# Patient Record
Sex: FEMALE | Race: WHITE | URBAN - METROPOLITAN AREA
[De-identification: names, ages, dates, MRNs, and addresses within clinical notes are randomized per-mention and may not be internally consistent; named-entity substitution may affect disease eponyms.]

---

## 2017-01-06 ENCOUNTER — OFFICE VISIT (OUTPATIENT)
Dept: INTERNAL MEDICINE CLINIC | Age: 72
End: 2017-01-06

## 2017-01-06 VITALS
DIASTOLIC BLOOD PRESSURE: 84 MMHG | HEART RATE: 58 BPM | TEMPERATURE: 98.2 F | RESPIRATION RATE: 16 BRPM | HEIGHT: 65 IN | WEIGHT: 167 LBS | BODY MASS INDEX: 27.82 KG/M2 | SYSTOLIC BLOOD PRESSURE: 138 MMHG | OXYGEN SATURATION: 98 %

## 2017-01-06 DIAGNOSIS — I10 ESSENTIAL HYPERTENSION: ICD-10-CM

## 2017-01-06 DIAGNOSIS — Z72.0 TOBACCO ABUSE: ICD-10-CM

## 2017-01-06 DIAGNOSIS — E78.2 MIXED HYPERLIPIDEMIA: ICD-10-CM

## 2017-01-06 DIAGNOSIS — E55.9 VITAMIN D DEFICIENCY: ICD-10-CM

## 2017-01-06 DIAGNOSIS — F41.9 ANXIETY: ICD-10-CM

## 2017-01-06 DIAGNOSIS — R73.03 PRE-DIABETES: ICD-10-CM

## 2017-01-06 DIAGNOSIS — Z76.89 ENCOUNTER TO ESTABLISH CARE WITH NEW DOCTOR: Primary | ICD-10-CM

## 2017-01-06 DIAGNOSIS — Z82.49 FAMILY HISTORY OF ABDOMINAL AORTIC ANEURYSM (AAA): ICD-10-CM

## 2017-01-06 RX ORDER — LORAZEPAM 2 MG/1
TABLET ORAL
Refills: 0 | COMMUNITY
Start: 2016-12-19 | End: 2017-01-06 | Stop reason: SDUPTHER

## 2017-01-06 RX ORDER — LISINOPRIL 40 MG/1
TABLET ORAL
COMMUNITY
Start: 2016-12-13 | End: 2017-04-03 | Stop reason: SDUPTHER

## 2017-01-06 RX ORDER — METFORMIN HYDROCHLORIDE 500 MG/1
TABLET ORAL
COMMUNITY
Start: 2016-12-13 | End: 2017-04-03 | Stop reason: SDUPTHER

## 2017-01-06 RX ORDER — LORAZEPAM 2 MG/1
2 TABLET ORAL 4 TIMES DAILY
Qty: 120 TAB | Refills: 1 | Status: SHIPPED | OUTPATIENT
Start: 2017-01-06 | End: 2017-01-06 | Stop reason: SDUPTHER

## 2017-01-06 RX ORDER — METOPROLOL SUCCINATE 50 MG/1
TABLET, EXTENDED RELEASE ORAL
COMMUNITY
Start: 2016-11-18 | End: 2017-02-10 | Stop reason: SDUPTHER

## 2017-01-06 RX ORDER — SIMVASTATIN 20 MG/1
TABLET, FILM COATED ORAL
COMMUNITY
Start: 2016-12-13 | End: 2017-04-03 | Stop reason: SDUPTHER

## 2017-01-06 RX ORDER — LORAZEPAM 2 MG/1
2 TABLET ORAL 4 TIMES DAILY
Qty: 120 TAB | Refills: 1 | Status: SHIPPED | OUTPATIENT
Start: 2017-01-17 | End: 2017-03-03 | Stop reason: SDUPTHER

## 2017-01-06 RX ORDER — ASPIRIN 325 MG
TABLET, DELAYED RELEASE (ENTERIC COATED) ORAL
COMMUNITY
Start: 2016-12-09 | End: 2018-02-21 | Stop reason: ALTCHOICE

## 2017-01-06 NOTE — PROGRESS NOTES
Dwaine Moore is a 66 y.o. female who presents today for Establish Care  . She has a history of   Patient Active Problem List   Diagnosis Code    Essential hypertension I10    Mixed hyperlipidemia E78.2    Vitamin D deficiency E55.9    Pre-diabetes R73.03    Tobacco abuse Z72.0    Family history of abdominal aortic aneurysm (AAA) Z82.49   . Today patient is here to establish care. Previous PCP Dr. Dima Leal. she is switching because he retired. Last seen in the fall. Records are not  available for me to review. she does not have other concerns. HTN: diagnosed several years ago. She is on ACEi and BB for some time. Has been pretty well controlled. Denies any s/e. Pre-DM: Taking metformin for about 3 yrs. Taking 500mg PO BID.     HLD: on zocor. Stable. Anxiety: On Ativan 2mg PO QID. She has been on this for many years (decades). Has not been on anything else for decades as well. Last refilled on 12/19/16 for one month. Pt notes the same dose for > 10 yrs. Vitamin D def: Prescribed weekly Vitamin D, but has not started. Social: Lives here with her . Independent of ADL's. [place] in the past.   Two children,   + 2ppd X 50yrs; Has never attempted to quit. No EtOH use;     HM: notes that this is not a priority for her. Family History: some EtOH abuse in her siblings. Mother with AAA in her [de-identified]. ROS  Review of Systems   Constitutional: Negative for chills, fever and weight loss. HENT: Negative for congestion, hearing loss, sore throat and tinnitus. Eyes: Negative for blurred vision, double vision and photophobia. Respiratory: Negative for cough, hemoptysis, sputum production, shortness of breath and wheezing. Cardiovascular: Negative for chest pain, palpitations, orthopnea, claudication and leg swelling. Gastrointestinal: Negative for abdominal pain, blood in stool, constipation, diarrhea, heartburn, melena, nausea and vomiting.    Genitourinary: Negative for dysuria, frequency, hematuria and urgency. Musculoskeletal: Negative for joint pain and myalgias. Skin: Negative for rash. Neurological: Negative. Negative for headaches. Endo/Heme/Allergies: Does not bruise/bleed easily. Psychiatric/Behavioral: Negative for depression, hallucinations, memory loss, substance abuse and suicidal ideas. The patient is nervous/anxious. Visit Vitals    /84 (BP 1 Location: Left arm, BP Patient Position: Sitting)    Pulse (!) 58    Temp 98.2 °F (36.8 °C) (Oral)    Resp 16    Ht 5' 5\" (1.651 m)    Wt 167 lb (75.8 kg)    SpO2 98%    BMI 27.79 kg/m2       Physical Exam   Constitutional: She is oriented to person, place, and time and well-developed, well-nourished, and in no distress. No distress. HENT:   Head: Normocephalic and atraumatic. Right Ear: External ear normal.   Left Ear: External ear normal.   Mouth/Throat: Oropharynx is clear and moist.   Dentures to upper and lower   Eyes: EOM are normal. Pupils are equal, round, and reactive to light. Neck: Normal range of motion. No thyromegaly present. Cardiovascular: Normal rate, regular rhythm and normal heart sounds. Exam reveals no gallop and no friction rub. No murmur heard. Pulmonary/Chest: Effort normal and breath sounds normal. No respiratory distress. She has no wheezes. She has no rales. Abdominal: Soft. Bowel sounds are normal. She exhibits no distension. There is no tenderness. There is no rebound. Musculoskeletal: Normal range of motion. She exhibits no edema. Neurological: She is alert and oriented to person, place, and time. Skin: Skin is warm and dry. No rash noted. She is not diaphoretic. No erythema.    Psychiatric: Affect and judgment normal.       Current Outpatient Prescriptions   Medication Sig    lisinopril (PRINIVIL, ZESTRIL) 40 mg tablet     metFORMIN (GLUCOPHAGE) 500 mg tablet     metoprolol succinate (TOPROL-XL) 50 mg XL tablet     simvastatin (ZOCOR) 20 mg tablet     [START ON 1/17/2017] LORazepam (ATIVAN) 2 mg tablet Take 1 Tab by mouth four (4) times daily. Max Daily Amount: 8 mg.  Cholecalciferol, Vitamin D3, 50,000 unit cap      No current facility-administered medications for this visit. Past Medical History   Diagnosis Date    Anxiety     Constipation     Difficulty swallowing pills     Headache     Hypertension     Muscle pain       Past Surgical History   Procedure Laterality Date    Hx cataract removal        Social History   Substance Use Topics    Smoking status: Current Every Day Smoker     Years: 50.00    Smokeless tobacco: Never Used    Alcohol use No      Family History   Problem Relation Age of Onset    Dementia Mother     Anxiety Mother     Pulmonary Embolism Father     Hypertension Father         Allergies   Allergen Reactions    Ciprofloxacin Unknown (comments)    Lidocaine Shortness of Breath    Pcn [Penicillins] Unknown (comments)    Sulfa (Sulfonamide Antibiotics) Unknown (comments)        Assessment/Plan  Tony Johnson was seen today for establish care. Diagnoses and all orders for this visit:    Encounter to establish care with new doctor - Will get old records. Essential hypertension - at goal on dual meds. -     METABOLIC PANEL, COMPREHENSIVE  -     CBC WITH AUTOMATED DIFF  -     URINALYSIS W/ RFLX MICROSCOPIC    Mixed hyperlipidemia - On statin for some time. FLP before follow up. -     LIPID PANEL    Vitamin D deficiency - not taking replacement. Will follow up on old numbers. Pre-diabetes - on low dose metformin. Check.   -     HEMOGLOBIN A1C WITH EAG    Tobacco abuse - significant tobacco use. Discussed the importance of cessation. Family history of abdominal aortic aneurysm (AAA) - mother had this. Has been screened with mild abnormality. Will f/u old records. Anxiety - discussed concern over the amount of benzos that she is taking.  checked, refills have been very stable. Pt has also not been on any different doses for decades. Discussed that she needs to be open to trying new meds in hopes of decreasing need of benzos as these are dangerous, especially in people >64yo. Over 50% of a visit of 45 minutes spent reviewing diagnosis and treatment options and side effects of medicines. -     TSH 3RD GENERATION  -     Discontinue: LORazepam (ATIVAN) 2 mg tablet; Take 1 Tab by mouth four (4) times daily. Max Daily Amount: 8 mg.  -     LORazepam (ATIVAN) 2 mg tablet; Take 1 Tab by mouth four (4) times daily. Max Daily Amount: 8 mg.         Follow-up Disposition:  Return in about 2 months (around 3/6/2017) for Physical Exam.    Farzana Rodarte MD  1/6/2017

## 2017-01-06 NOTE — MR AVS SNAPSHOT
Visit Information Date & Time Provider Department Dept. Phone Encounter #  
 1/6/2017 10:45 AM Farzana Roadrte MD [facility] [facility] [id number] Follow-up Instructions Return in about 2 months (around 3/6/2017) for Physical Exam. Upcoming Health Maintenance Date Due DTaP/Tdap/Td series (1 - Tdap) 9/28/1965 BREAST CANCER SCRN MAMMOGRAM 9/28/1994 FOBT Q 1 YEAR AGE 50-75 9/28/1994 ZOSTER VACCINE AGE 60> 9/28/2004 GLAUCOMA SCREENING Q2Y 9/28/2009 OSTEOPOROSIS SCREENING (DEXA) 9/28/2009 Pneumococcal 65+ Low/Medium Risk (1 of 2 - PCV13) 9/28/2009 MEDICARE YEARLY EXAM 9/28/2009 INFLUENZA AGE 9 TO ADULT 8/1/2016 Allergies as of 1/6/2017  Review Complete On: 1/6/2017 By: Constance Armando LPN Severity Noted Reaction Type Reactions Ciprofloxacin  01/06/2017    Unknown (comments) Lidocaine  01/06/2017    Shortness of Breath Pcn [Penicillins]  01/06/2017    Unknown (comments) Sulfa (Sulfonamide Antibiotics)  01/06/2017    Unknown (comments) Current Immunizations  Never Reviewed No immunizations on file. Not reviewed this visit You Were Diagnosed With   
  
 Codes Comments Encounter to establish care with new doctor    -  Primary ICD-10-CM: Z71.89 ICD-9-CM: V65.8 Essential hypertension     ICD-10-CM: I10 
ICD-9-CM: 401.9 Mixed hyperlipidemia     ICD-10-CM: E78.2 ICD-9-CM: 272.2 Vitamin D deficiency     ICD-10-CM: E55.9 ICD-9-CM: 268.9 Pre-diabetes     ICD-10-CM: R73.03 
ICD-9-CM: 790.29 Tobacco abuse     ICD-10-CM: Z72.0 ICD-9-CM: 305.1 Family history of abdominal aortic aneurysm (AAA)     ICD-10-CM: Z82.49 
ICD-9-CM: V17.49 Anxiety     ICD-10-CM: F41.9 ICD-9-CM: 300.00 Vitals BP Pulse Temp Resp Height(growth percentile) Weight(growth percentile)  138/84 (BP 1 Location: Left arm, BP Patient Position: Sitting) (!) 58 98.2 °F (36.8 °C) (Oral) 16 5' 5\" (1.651 m) 167 lb (75.8 kg) SpO2 BMI OB Status Smoking Status 98% 27.79 kg/m2 Postmenopausal Current Every Day Smoker Vitals History BMI and BSA Data Body Mass Index Body Surface Area  
 27.79 kg/m 2 1.86 m 2 Preferred Pharmacy Pharmacy Name Phone [id number] [phone] Your Updated Medication List  
  
   
This list is accurate as of: 1/6/17 11:27 AM.  Always use your most recent med list.  
  
  
  
  
 Cholecalciferol (Vitamin D3) 50,000 unit Cap  
  
 lisinopril 40 mg tablet Commonly known as:  PRINIVIL, ZESTRIL  
  
 LORazepam 2 mg tablet Commonly known as:  ATIVAN Take 1 Tab by mouth four (4) times daily. Max Daily Amount: 8 mg.  
  
 metFORMIN 500 mg tablet Commonly known as:  GLUCOPHAGE  
  
 metoprolol succinate 50 mg XL tablet Commonly known as:  TOPROL-XL  
  
 simvastatin 20 mg tablet Commonly known as:  ZOCOR Prescriptions Printed Refills LORazepam (ATIVAN) 2 mg tablet 1 Sig: Take 1 Tab by mouth four (4) times daily. Max Daily Amount: 8 mg. Class: Print Route: Oral  
  
We Performed the Following CBC WITH AUTOMATED DIFF [38941 CPT(R)] HEMOGLOBIN A1C WITH EAG [86595 CPT(R)] LIPID PANEL [81797 CPT(R)] METABOLIC PANEL, COMPREHENSIVE [40980 CPT(R)] TSH 3RD GENERATION [66844 CPT(R)] URINALYSIS W/ RFLX MICROSCOPIC [71606 CPT(R)] Follow-up Instructions Return in about 2 months (around 3/6/2017) for Physical Exam.  
  
  
Introducing [facility] & HEALTH SERVICES! Gladys Gallagher introduces [employer] patient portal. Now you can access parts of your medical record, email your doctor's office, and request medication refills online. 1. In your internet browser, go to [email or web address] 2. Click on the First Time User? Click Here link in the Sign In box.  You will see the New Member Sign Up page. 3. Enter your [employer] Access Code exactly as it appears below. You will not need to use this code after youve completed the sign-up process. If you do not sign up before the expiration date, you must request a new code. · [employer] Access Code: [email or web address] Expires: 4/6/2017 10:15 AM 
 
4. Enter the last four digits of your Social Security Number (xxxx) and Date of Birth (mm/dd/yyyy) as indicated and click Submit. You will be taken to the next sign-up page. 5. Create a [employer] ID. This will be your [employer] login ID and cannot be changed, so think of one that is secure and easy to remember. 6. Create a [employer] password. You can change your password at any time. 7. Enter your Password Reset Question and Answer. This can be used at a later time if you forget your password. 8. Enter your e-mail address. You will receive e-mail notification when new information is available in [address] 9. Click Sign Up. You can now view and download portions of your medical record. 10. Click the Download Summary menu link to download a portable copy of your medical information. If you have questions, please visit the Frequently Asked Questions section of the [employer] website. Remember, [employer] is NOT to be used for urgent needs. For medical emergencies, dial 911. Now available from your iPhone and Android! Please provide this summary of care documentation to your next provider. Your primary care clinician is listed as Natan Carmichael. If you have any questions after today's visit, please call [phone].

## 2017-02-13 RX ORDER — METOPROLOL SUCCINATE 50 MG/1
50 TABLET, EXTENDED RELEASE ORAL DAILY
Qty: 90 TAB | Refills: 0 | Status: SHIPPED | OUTPATIENT
Start: 2017-02-13 | End: 2017-04-14 | Stop reason: SDUPTHER

## 2017-02-25 LAB
ALBUMIN SERPL-MCNC: 4.3 G/DL (ref 3.5–4.8)
ALBUMIN/GLOB SERPL: 2 {RATIO} (ref 1.1–2.5)
ALP SERPL-CCNC: 40 IU/L (ref 39–117)
ALT SERPL-CCNC: 9 IU/L (ref 0–32)
APPEARANCE UR: ABNORMAL
AST SERPL-CCNC: 14 IU/L (ref 0–40)
BACTERIA #/AREA URNS HPF: NORMAL /[HPF]
BASOPHILS # BLD AUTO: 0.1 X10E3/UL (ref 0–0.2)
BASOPHILS NFR BLD AUTO: 1 %
BILIRUB SERPL-MCNC: 0.4 MG/DL (ref 0–1.2)
BILIRUB UR QL STRIP: NEGATIVE
BUN SERPL-MCNC: 8 MG/DL (ref 8–27)
BUN/CREAT SERPL: 11 (ref 11–26)
CALCIUM SERPL-MCNC: 9.6 MG/DL (ref 8.7–10.3)
CASTS URNS QL MICRO: NORMAL /LPF
CHLORIDE SERPL-SCNC: 101 MMOL/L (ref 96–106)
CHOLEST SERPL-MCNC: 121 MG/DL (ref 100–199)
CO2 SERPL-SCNC: 26 MMOL/L (ref 18–29)
COLOR UR: YELLOW
CREAT SERPL-MCNC: 0.76 MG/DL (ref 0.57–1)
EOSINOPHIL # BLD AUTO: 0.2 X10E3/UL (ref 0–0.4)
EOSINOPHIL NFR BLD AUTO: 3 %
EPI CELLS #/AREA URNS HPF: NORMAL /HPF
ERYTHROCYTE [DISTWIDTH] IN BLOOD BY AUTOMATED COUNT: 14.2 % (ref 12.3–15.4)
EST. AVERAGE GLUCOSE BLD GHB EST-MCNC: 120 MG/DL
GLOBULIN SER CALC-MCNC: 2.1 G/DL (ref 1.5–4.5)
GLUCOSE SERPL-MCNC: 95 MG/DL (ref 65–99)
GLUCOSE UR QL: NEGATIVE
HBA1C MFR BLD: 5.8 % (ref 4.8–5.6)
HCT VFR BLD AUTO: 44.3 % (ref 34–46.6)
HDLC SERPL-MCNC: 39 MG/DL
HGB BLD-MCNC: 14.9 G/DL (ref 11.1–15.9)
HGB UR QL STRIP: ABNORMAL
IMM GRANULOCYTES # BLD: 0 X10E3/UL (ref 0–0.1)
IMM GRANULOCYTES NFR BLD: 0 %
INTERPRETATION, 910389: NORMAL
KETONES UR QL STRIP: NEGATIVE
LDLC SERPL CALC-MCNC: 54 MG/DL (ref 0–99)
LEUKOCYTE ESTERASE UR QL STRIP: NEGATIVE
LYMPHOCYTES # BLD AUTO: 2.1 X10E3/UL (ref 0.7–3.1)
LYMPHOCYTES NFR BLD AUTO: 27 %
MCH RBC QN AUTO: 31.6 PG (ref 26.6–33)
MCHC RBC AUTO-ENTMCNC: 33.6 G/DL (ref 31.5–35.7)
MCV RBC AUTO: 94 FL (ref 79–97)
MICRO URNS: ABNORMAL
MONOCYTES # BLD AUTO: 0.7 X10E3/UL (ref 0.1–0.9)
MONOCYTES NFR BLD AUTO: 10 %
MUCOUS THREADS URNS QL MICRO: PRESENT
NEUTROPHILS # BLD AUTO: 4.6 X10E3/UL (ref 1.4–7)
NEUTROPHILS NFR BLD AUTO: 59 %
NITRITE UR QL STRIP: NEGATIVE
PH UR STRIP: 6.5 [PH] (ref 5–7.5)
PLATELET # BLD AUTO: 207 X10E3/UL (ref 150–379)
POTASSIUM SERPL-SCNC: 3.6 MMOL/L (ref 3.5–5.2)
PROT SERPL-MCNC: 6.4 G/DL (ref 6–8.5)
PROT UR QL STRIP: NEGATIVE
RBC # BLD AUTO: 4.72 X10E6/UL (ref 3.77–5.28)
RBC #/AREA URNS HPF: NORMAL /HPF
SODIUM SERPL-SCNC: 141 MMOL/L (ref 134–144)
SP GR UR: 1.02 (ref 1–1.03)
TRIGL SERPL-MCNC: 141 MG/DL (ref 0–149)
TSH SERPL DL<=0.005 MIU/L-ACNC: 3.24 UIU/ML (ref 0.45–4.5)
UROBILINOGEN UR STRIP-MCNC: 0.2 MG/DL (ref 0.2–1)
VLDLC SERPL CALC-MCNC: 28 MG/DL (ref 5–40)
WBC # BLD AUTO: 7.7 X10E3/UL (ref 3.4–10.8)
WBC #/AREA URNS HPF: NORMAL /HPF

## 2017-03-03 ENCOUNTER — OFFICE VISIT (OUTPATIENT)
Dept: INTERNAL MEDICINE CLINIC | Age: 72
End: 2017-03-03

## 2017-03-03 VITALS
TEMPERATURE: 98 F | RESPIRATION RATE: 16 BRPM | SYSTOLIC BLOOD PRESSURE: 145 MMHG | BODY MASS INDEX: 26.82 KG/M2 | HEART RATE: 71 BPM | HEIGHT: 65 IN | DIASTOLIC BLOOD PRESSURE: 81 MMHG | OXYGEN SATURATION: 97 % | WEIGHT: 161 LBS

## 2017-03-03 DIAGNOSIS — R73.03 PRE-DIABETES: ICD-10-CM

## 2017-03-03 DIAGNOSIS — I10 ESSENTIAL HYPERTENSION: Primary | ICD-10-CM

## 2017-03-03 DIAGNOSIS — F41.9 ANXIETY: ICD-10-CM

## 2017-03-03 DIAGNOSIS — E78.2 MIXED HYPERLIPIDEMIA: ICD-10-CM

## 2017-03-03 RX ORDER — DOCUSATE SODIUM 100 MG/1
100 CAPSULE, LIQUID FILLED ORAL 2 TIMES DAILY
COMMUNITY
End: 2020-10-26

## 2017-03-03 RX ORDER — LORAZEPAM 2 MG/1
2 TABLET ORAL 4 TIMES DAILY
Qty: 120 TAB | Refills: 1 | Status: SHIPPED | OUTPATIENT
Start: 2017-03-20 | End: 2017-05-17 | Stop reason: SDUPTHER

## 2017-03-03 RX ORDER — AMLODIPINE BESYLATE 5 MG/1
5 TABLET ORAL DAILY
Qty: 30 TAB | Refills: 3 | Status: SHIPPED | OUTPATIENT
Start: 2017-03-03 | End: 2017-04-14 | Stop reason: SDUPTHER

## 2017-03-03 NOTE — MR AVS SNAPSHOT
Visit Information Date & Time Provider Department Dept. Phone Encounter #  
 3/3/2017 11:15 AM Jose Manuel Esparza MD [facility] [address] [facility] [id number] Follow-up Instructions Return in about 6 weeks (around 4/14/2017) for BP check. Upcoming Health Maintenance Date Due DTaP/Tdap/Td series (1 - Tdap) 9/28/1965 BREAST CANCER SCRN MAMMOGRAM 9/28/1994 FOBT Q 1 YEAR AGE 50-75 9/28/1994 ZOSTER VACCINE AGE 60> 9/28/2004 GLAUCOMA SCREENING Q2Y 9/28/2009 OSTEOPOROSIS SCREENING (DEXA) 9/28/2009 Pneumococcal 65+ Low/Medium Risk (1 of 2 - PCV13) 9/28/2009 MEDICARE YEARLY EXAM 9/28/2009 INFLUENZA AGE 9 TO ADULT 8/1/2016 Allergies as of 3/3/2017  Review Complete On: 3/3/2017 By: Chris Dillard LPN Severity Noted Reaction Type Reactions Ciprofloxacin  01/06/2017    Unknown (comments) Lidocaine  01/06/2017    Shortness of Breath Pcn [Penicillins]  01/06/2017    Unknown (comments) Sulfa (Sulfonamide Antibiotics)  01/06/2017    Unknown (comments) Current Immunizations  Never Reviewed No immunizations on file. Not reviewed this visit You Were Diagnosed With   
  
 Codes Comments Essential hypertension    -  Primary ICD-10-CM: I10 
ICD-9-CM: 401.9 Pre-diabetes     ICD-10-CM: R73.03 
ICD-9-CM: 790.29 Anxiety     ICD-10-CM: F41.9 ICD-9-CM: 300.00 Mixed hyperlipidemia     ICD-10-CM: E78.2 ICD-9-CM: 272.2 Vitals BP  
  
  
  
  
  
 145/81 (BP 1 Location: Left arm, BP Patient Position: Sitting) Vitals History BMI and BSA Data Body Mass Index Body Surface Area  
 26.79 kg/m 2 1.83 m 2 Preferred Pharmacy Pharmacy Name Phone [id number] [phone] Your Updated Medication List  
  
   
This list is accurate as of: 3/3/17 12:01 PM.  Always use your most recent med list.  
  
  
  
  
 amLODIPine 5 mg tablet Commonly known as:  Huber Cater Take 1 Tab by mouth daily. Cholecalciferol (Vitamin D3) 50,000 unit Cap COLACE 100 mg capsule Generic drug:  docusate sodium Take 100 mg by mouth two (2) times a day. lisinopril 40 mg tablet Commonly known as:  PRINIVIL, ZESTRIL  
  
 LORazepam 2 mg tablet Commonly known as:  ATIVAN Take 1 Tab by mouth four (4) times daily. Max Daily Amount: 8 mg. Start taking on:  3/20/2017  
  
 metFORMIN 500 mg tablet Commonly known as:  GLUCOPHAGE  
  
 metoprolol succinate 50 mg XL tablet Commonly known as:  TOPROL-XL Take 1 Tab by mouth daily for 90 days. simvastatin 20 mg tablet Commonly known as:  ZOCOR Prescriptions Printed Refills LORazepam (ATIVAN) 2 mg tablet 1 Starting on: 3/20/2017 Sig: Take 1 Tab by mouth four (4) times daily. Max Daily Amount: 8 mg. Class: Print Route: Oral  
  
Prescriptions Sent to Pharmacy Refills  
 amLODIPine (NORVASC) 5 mg tablet 3 Sig: Take 1 Tab by mouth daily. Class: Normal  
 Pharmacy: [id number] [address] [place] [address] #: [phone] Route: Oral  
  
We Performed the Following REFERRAL TO PSYCHIATRY [REF91 Custom] Comments:  
 Please evaluate patient for anxiety. Follow-up Instructions Return in about 6 weeks (around 4/14/2017) for BP check. Referral Information Referral ID Referred By Referred To  
  
 [id number] Kirstin Hatch [address] [place] [address] Phone: [phone] Fax: [phone] Visits Status Start Date End Date 1 New Request 3/3/17 3/3/18 If your referral has a status of pending review or denied, additional information will be sent to support the outcome of this decision. Introducing [facility] & HEALTH SERVICES!    
 Marcial Jaime introduces [employer] patient portal. Now you can access parts of your medical record, email your doctor's office, and request medication refills online. 1. In your internet browser, go to [email or web address] 2. Click on the First Time User? Click Here link in the Sign In box. You will see the New Member Sign Up page. 3. Enter your [employer] Access Code exactly as it appears below. You will not need to use this code after youve completed the sign-up process. If you do not sign up before the expiration date, you must request a new code. · [employer] Access Code: [email or web address] Expires: 4/6/2017 10:15 AM 
 
4. Enter the last four digits of your Social Security Number (xxxx) and Date of Birth (mm/dd/yyyy) as indicated and click Submit. You will be taken to the next sign-up page. 5. Create a [employer] ID. This will be your [employer] login ID and cannot be changed, so think of one that is secure and easy to remember. 6. Create a [employer] password. You can change your password at any time. 7. Enter your Password Reset Question and Answer. This can be used at a later time if you forget your password. 8. Enter your e-mail address. You will receive e-mail notification when new information is available in [address] 9. Click Sign Up. You can now view and download portions of your medical record. 10. Click the Download Summary menu link to download a portable copy of your medical information. If you have questions, please visit the Frequently Asked Questions section of the [employer] website. Remember, [employer] is NOT to be used for urgent needs. For medical emergencies, dial 911. Now available from your iPhone and Android! Please provide this summary of care documentation to your next provider. Your primary care clinician is listed as Jean Paul Valladares. If you have any questions after today's visit, please call [phone].

## 2017-03-03 NOTE — PROGRESS NOTES
Sue Campos is a 66 y.o. female who presents today for Hypertension  . She has a history of   Patient Active Problem List   Diagnosis Code    Essential hypertension I10    Mixed hyperlipidemia E78.2    Vitamin D deficiency E55.9    Pre-diabetes R73.03    Tobacco abuse Z72.0    Family history of abdominal aortic aneurysm (AAA) Z82.49    Anxiety F41.9   . Today patient is here for BP follow up. she does not have other concerns. Hypertension - at home BP running high (150's/90's). Taking meds as prescribed. Still smoking. Unknown why taking toprol xl BID. Will have her on 50mg daily. Hypertension ROS: taking medications as instructed, no medication side effects noted, no TIA's, no chest pain on exertion, no dyspnea on exertion, no swelling of ankles     reports that she has been smoking. She has smoked for the past 50.00 years. She has never used smokeless tobacco.    reports that she does not drink alcohol. BP Readings from Last 2 Encounters:   03/03/17 145/81   01/06/17 138/84     Hyperlipidemia - stable  Currently she takes 20 mg of zocor  ROS: taking medications as instructed, no medication side effects noted  No new myalgias, no joint pains, no weakness  No TIA's, no chest pain on exertion, no dyspnea on exertion, no swelling of ankles. Lab Results   Component Value Date/Time    Cholesterol, total 121 02/24/2017 09:03 AM    HDL Cholesterol 39 02/24/2017 09:03 AM    LDL, calculated 54 02/24/2017 09:03 AM    VLDL, calculated 28 02/24/2017 09:03 AM    Triglyceride 141 02/24/2017 09:03 AM     Anxiety: chronic use of high dose benzos. Concerned about this as this is such a high dose. This has been on this for decades. ROS  Review of Systems   Constitutional: Negative for chills, fever and weight loss. HENT: Negative for congestion and sore throat. Eyes: Negative for blurred vision, double vision and photophobia. Respiratory: Negative for cough and shortness of breath. Cardiovascular: Negative for chest pain, palpitations and leg swelling. Gastrointestinal: Negative for abdominal pain, constipation, diarrhea, heartburn, nausea and vomiting. Genitourinary: Negative for dysuria, frequency and urgency. Musculoskeletal: Negative for joint pain and myalgias. Skin: Negative for rash. Neurological: Negative. Negative for headaches. Endo/Heme/Allergies: Does not bruise/bleed easily. Psychiatric/Behavioral: Negative for memory loss and suicidal ideas. Visit Vitals    /81 (BP 1 Location: Left arm, BP Patient Position: Sitting)    Pulse 71    Temp 98 °F (36.7 °C) (Oral)    Resp 16    Ht 5' 5\" (1.651 m)    Wt 161 lb (73 kg)    SpO2 97%    BMI 26.79 kg/m2       Physical Exam   Constitutional: She is oriented to person, place, and time. She appears well-developed and well-nourished. No distress. HENT:   Head: Normocephalic and atraumatic. Neck: Normal range of motion. Neck supple. No thyromegaly present. Cardiovascular: Normal rate and regular rhythm. No murmur heard. Pulmonary/Chest: Effort normal and breath sounds normal. She has no wheezes. Musculoskeletal: Normal range of motion. She exhibits no edema. Neurological: She is alert and oriented to person, place, and time. No cranial nerve deficit. Skin: Skin is warm and dry. Psychiatric: She has a normal mood and affect. Her behavior is normal.         Current Outpatient Prescriptions   Medication Sig    docusate sodium (COLACE) 100 mg capsule Take 100 mg by mouth two (2) times a day.  amLODIPine (NORVASC) 5 mg tablet Take 1 Tab by mouth daily.  [START ON 3/20/2017] LORazepam (ATIVAN) 2 mg tablet Take 1 Tab by mouth four (4) times daily. Max Daily Amount: 8 mg.  metoprolol succinate (TOPROL-XL) 50 mg XL tablet Take 1 Tab by mouth daily for 90 days.     lisinopril (PRINIVIL, ZESTRIL) 40 mg tablet     metFORMIN (GLUCOPHAGE) 500 mg tablet     simvastatin (ZOCOR) 20 mg tablet     Cholecalciferol, Vitamin D3, 50,000 unit cap      No current facility-administered medications for this visit. Past Medical History:   Diagnosis Date    Anxiety     Constipation     Difficulty swallowing pills     Headache     Hypertension     Muscle pain       Past Surgical History:   Procedure Laterality Date    HX CATARACT REMOVAL        Social History   Substance Use Topics    Smoking status: Current Every Day Smoker     Years: 50.00    Smokeless tobacco: Never Used    Alcohol use No      Family History   Problem Relation Age of Onset    Dementia Mother     Anxiety Mother     Pulmonary Embolism Father     Hypertension Father         Allergies   Allergen Reactions    Ciprofloxacin Unknown (comments)    Lidocaine Shortness of Breath    Pcn [Penicillins] Unknown (comments)    Sulfa (Sulfonamide Antibiotics) Unknown (comments)        Assessment/Plan  Ashlie Toscano was seen today for hypertension. Diagnoses and all orders for this visit:    Essential hypertension - not at goal. Add low dose CCB. 6 week f/u for BP check. -     amLODIPine (NORVASC) 5 mg tablet; Take 1 Tab by mouth daily. Pre-diabetes - good control    Anxiety - discussed that she is on a very high benzo dose. Though I understand that this has been stable for a very long time, need psych input. She will schedule this. -     LORazepam (ATIVAN) 2 mg tablet; Take 1 Tab by mouth four (4) times daily. Max Daily Amount: 8 mg.  -     REFERRAL TO PSYCHIATRY    Mixed hyperlipidemia - good control. Colace for constipation    Follow-up Disposition:  Return in about 6 weeks (around 4/14/2017) for BP check.     Teodoro Granado MD  3/3/2017

## 2017-04-03 RX ORDER — LISINOPRIL 40 MG/1
40 TABLET ORAL DAILY
Qty: 90 TAB | Refills: 1 | Status: SHIPPED | OUTPATIENT
Start: 2017-04-03 | End: 2017-09-01 | Stop reason: SDUPTHER

## 2017-04-03 RX ORDER — METFORMIN HYDROCHLORIDE 500 MG/1
500 TABLET ORAL
Qty: 90 TAB | Refills: 1 | Status: SHIPPED | OUTPATIENT
Start: 2017-04-03 | End: 2017-05-19 | Stop reason: SDUPTHER

## 2017-04-03 RX ORDER — SIMVASTATIN 20 MG/1
20 TABLET, FILM COATED ORAL
Qty: 90 TAB | Refills: 1 | Status: SHIPPED | OUTPATIENT
Start: 2017-04-03 | End: 2017-09-01 | Stop reason: SDUPTHER

## 2017-04-07 ENCOUNTER — TELEPHONE (OUTPATIENT)
Dept: INTERNAL MEDICINE CLINIC | Age: 72
End: 2017-04-07

## 2017-04-07 NOTE — TELEPHONE ENCOUNTER
R/C to Pt and recommended Dr. Víctor Ersnt, phone: [phone], fax: [phone]. Pt verbalized understanding.

## 2017-04-07 NOTE — TELEPHONE ENCOUNTER
Pt is calling about the dr she was to get appt with for Psychiatric care, she caleld to get the appt and the dr doesn't see anyone on fridays of which the patients refers.  She is asking if Dr Adela Bundy has someone else in mind, Please contact her at [phone]

## 2017-04-10 NOTE — TELEPHONE ENCOUNTER
Pt states that Dr. Robbi Orellana only sees inpatient and the location is [address] [place] [address] in [place]. She wants to know if there is any other psychiatric care doctor that Dr. Flora Quiñones recommends. She is preferably not wanting a doctor in the city. Also, she called Dr. Franco Orn office and they don't see pt on Friday. She doesn't drive and her husbands only day off is Friday. She wants to know if we can help her get in with a doctor on a Friday.  Call pt back at [phone]

## 2017-04-10 NOTE — TELEPHONE ENCOUNTER
R/C to Pt and provided phone # to [employer], [facility] and Luis Galvin. Pt verbalized understanding.

## 2017-04-14 ENCOUNTER — OFFICE VISIT (OUTPATIENT)
Dept: INTERNAL MEDICINE CLINIC | Age: 72
End: 2017-04-14

## 2017-04-14 VITALS
RESPIRATION RATE: 18 BRPM | DIASTOLIC BLOOD PRESSURE: 83 MMHG | WEIGHT: 166.2 LBS | BODY MASS INDEX: 27.69 KG/M2 | TEMPERATURE: 98 F | HEART RATE: 77 BPM | HEIGHT: 65 IN | OXYGEN SATURATION: 94 % | SYSTOLIC BLOOD PRESSURE: 128 MMHG

## 2017-04-14 DIAGNOSIS — F41.9 ANXIETY: ICD-10-CM

## 2017-04-14 DIAGNOSIS — I10 ESSENTIAL HYPERTENSION: Primary | ICD-10-CM

## 2017-04-14 RX ORDER — AMLODIPINE BESYLATE 5 MG/1
5 TABLET ORAL DAILY
Qty: 90 TAB | Refills: 1 | Status: SHIPPED | OUTPATIENT
Start: 2017-04-14 | End: 2017-09-01 | Stop reason: SDUPTHER

## 2017-04-14 RX ORDER — METOPROLOL SUCCINATE 50 MG/1
50 TABLET, EXTENDED RELEASE ORAL DAILY
Qty: 90 TAB | Refills: 2 | Status: SHIPPED | OUTPATIENT
Start: 2017-04-14 | End: 2018-02-21 | Stop reason: SDUPTHER

## 2017-04-14 NOTE — MR AVS SNAPSHOT
Visit Information Date & Time Provider Department Dept. Phone Encounter #  
 4/14/2017 11:15 AM Janeen Yang MD [facility] [address] [id number] Follow-up Instructions Return in about 6 weeks (around 5/26/2017). Upcoming Health Maintenance Date Due DTaP/Tdap/Td series (1 - Tdap) 9/28/1965 BREAST CANCER SCRN MAMMOGRAM 9/28/1994 FOBT Q 1 YEAR AGE 50-75 9/28/1994 ZOSTER VACCINE AGE 60> 9/28/2004 GLAUCOMA SCREENING Q2Y 9/28/2009 OSTEOPOROSIS SCREENING (DEXA) 9/28/2009 Pneumococcal 65+ Low/Medium Risk (1 of 2 - PCV13) 9/28/2009 MEDICARE YEARLY EXAM 9/28/2009 INFLUENZA AGE 9 TO ADULT 8/1/2016 Allergies as of 4/14/2017  Review Complete On: 4/14/2017 By: Janeen Yang MD  
  
 Severity Noted Reaction Type Reactions Ciprofloxacin  01/06/2017    Unknown (comments) Lidocaine  01/06/2017    Shortness of Breath Pcn [Penicillins]  01/06/2017    Unknown (comments) Shellfish Derived  04/14/2017    Hives Sulfa (Sulfonamide Antibiotics)  01/06/2017    Unknown (comments) Current Immunizations  Never Reviewed No immunizations on file. Not reviewed this visit You Were Diagnosed With   
  
 Codes Comments Essential hypertension    -  Primary ICD-10-CM: I10 
ICD-9-CM: 401.9 Anxiety     ICD-10-CM: F41.9 ICD-9-CM: 300.00 Vitals BP Pulse Temp Resp Height(growth percentile) Weight(growth percentile) 128/83 (BP 1 Location: Left arm, BP Patient Position: Sitting) 77 98 °F (36.7 °C) (Oral) 18 5' 5\" (1.651 m) 166 lb 3.2 oz (75.4 kg) SpO2 BMI OB Status Smoking Status 94% 27.66 kg/m2 Postmenopausal Current Every Day Smoker Vitals History BMI and BSA Data Body Mass Index Body Surface Area  
 27.66 kg/m 2 1.86 m 2 Preferred Pharmacy Pharmacy Name Phone [address] [facility] [address] [phone] Your Updated Medication List  
  
   
This list is accurate as of: 4/14/17 11:57 AM.  Always use your most recent med list. amLODIPine 5 mg tablet Commonly known as:  [employer] Take 1 Tab by mouth daily. Cholecalciferol (Vitamin D3) 50,000 unit Cap COLACE 100 mg capsule Generic drug:  docusate sodium Take 100 mg by mouth two (2) times a day. lisinopril 40 mg tablet Commonly known as:  Adonay Bold Take 1 Tab by mouth daily. LORazepam 2 mg tablet Commonly known as:  ATIVAN Take 1 Tab by mouth four (4) times daily. Max Daily Amount: 8 mg.  
  
 metFORMIN 500 mg tablet Commonly known as:  GLUCOPHAGE Take 1 Tab by mouth daily (with breakfast). metoprolol succinate 50 mg XL tablet Commonly known as:  TOPROL-XL Take 1 Tab by mouth daily for 90 days. simvastatin 20 mg tablet Commonly known as:  ZOCOR Take 1 Tab by mouth nightly. Prescriptions Sent to Pharmacy Refills  
 amLODIPine (NORVASC) 5 mg tablet 1 Sig: Take 1 Tab by mouth daily. Class: Normal  
 Pharmacy: [address] [place] [address] Ph #: [phone] Route: Oral  
 metoprolol succinate (TOPROL-XL) 50 mg XL tablet 2 Sig: Take 1 Tab by mouth daily for 90 days. Class: Normal  
 Pharmacy: [address] [place] [address] Ph #: [phone] Route: Oral  
  
Follow-up Instructions Return in about 6 weeks (around 5/26/2017). Introducing [facility]! [employer] introduces [employer] patient portal. Now you can access parts of your medical record, email your doctor's office, and request medication refills online. 1. In your internet browser, go to [email or web address] 2. Click on the First Time User? Click Here link in the Sign In box. You will see the New Member Sign Up page. 3. Enter your [employer] Access Code exactly as it appears below. You will not need to use this code after youve completed the sign-up process. If you do not sign up before the expiration date, you must request a new code. · [employer] Access Code: [email or web address] Expires: 7/13/2017 11:57 AM 
 
4. Enter the last four digits of your Social Security Number (xxxx) and Date of Birth (mm/dd/yyyy) as indicated and click Submit. You will be taken to the next sign-up page. 5. Create a [employer] ID. This will be your [employer] login ID and cannot be changed, so think of one that is secure and easy to remember. 6. Create a [employer] password. You can change your password at any time. 7. Enter your Password Reset Question and Answer. This can be used at a later time if you forget your password. 8. Enter your e-mail address. You will receive e-mail notification when new information is available in [address] 9. Click Sign Up. You can now view and download portions of your medical record. 10. Click the Download Summary menu link to download a portable copy of your medical information. If you have questions, please visit the Frequently Asked Questions section of the [employer] website. Remember, [employer] is NOT to be used for urgent needs. For medical emergencies, dial 911. Now available from your iPhone and Android! Please provide this summary of care documentation to your next provider. Your primary care clinician is listed as Aaliyah Cordial. If you have any questions after today's visit, please call [phone].

## 2017-04-14 NOTE — PROGRESS NOTES
Mary Carmen Guerra is a 66 y.o. female who presents today for Blood Pressure Check  . She has a history of   Patient Active Problem List   Diagnosis Code    Essential hypertension I10    Mixed hyperlipidemia E78.2    Vitamin D deficiency E55.9    Pre-diabetes R73.03    Tobacco abuse Z72.0    Family history of abdominal aortic aneurysm (AAA) Z82.49    Anxiety F41.9   . Today patient is here for BP check. she does not have other concerns. Going to [place] for 50th wedding anniversary. Hypertension - Low dose CCB added at last visit. No s/e with this meds. Still on ACEi. Hypertension ROS: taking medications as instructed, no medication side effects noted, no TIA's, no chest pain on exertion, no dyspnea on exertion, no swelling of ankles     reports that she has been smoking. She has smoked for the past 50.00 years. She has never used smokeless tobacco.    reports that she does not drink alcohol. BP Readings from Last 2 Encounters:   04/14/17 128/83   03/03/17 145/81         ROS  Review of Systems   Constitutional: Negative for chills, fever and weight loss. HENT: Negative for congestion and sore throat. Eyes: Negative for blurred vision, double vision and photophobia. Respiratory: Negative for cough and shortness of breath. Cardiovascular: Negative for chest pain, palpitations and leg swelling. Gastrointestinal: Negative for abdominal pain, constipation, diarrhea, heartburn, nausea and vomiting. Genitourinary: Negative for dysuria, frequency and urgency. Musculoskeletal: Negative for joint pain and myalgias. Skin: Negative for rash. Neurological: Negative. Negative for headaches. Endo/Heme/Allergies: Does not bruise/bleed easily. Psychiatric/Behavioral: Negative for depression, hallucinations, memory loss, substance abuse and suicidal ideas. The patient is nervous/anxious. The patient does not have insomnia.         Visit Vitals    /83 (BP 1 Location: Left arm, BP Patient Position: Sitting)    Pulse 77    Temp 98 °F (36.7 °C) (Oral)    Resp 18    Ht 5' 5\" (1.651 m)    Wt 166 lb 3.2 oz (75.4 kg)    SpO2 94%    BMI 27.66 kg/m2       Physical Exam   Constitutional: She is oriented to person, place, and time. She appears well-developed and well-nourished. No distress. HENT:   Head: Normocephalic and atraumatic. Right Ear: External ear normal.   Left Ear: External ear normal.   Eyes: EOM are normal. Pupils are equal, round, and reactive to light. Neck: Normal range of motion. Neck supple. Cardiovascular: Normal rate and regular rhythm. No murmur heard. Pulmonary/Chest: Effort normal and breath sounds normal. No respiratory distress. Abdominal: Soft. Bowel sounds are normal. She exhibits no distension. Musculoskeletal: Normal range of motion. She exhibits no edema. Neurological: She is alert and oriented to person, place, and time. Skin: Skin is warm and dry. Psychiatric: She has a normal mood and affect. Her behavior is normal.         Current Outpatient Prescriptions   Medication Sig    amLODIPine (NORVASC) 5 mg tablet Take 1 Tab by mouth daily.  metoprolol succinate (TOPROL-XL) 50 mg XL tablet Take 1 Tab by mouth daily for 90 days.  simvastatin (ZOCOR) 20 mg tablet Take 1 Tab by mouth nightly.  lisinopril (PRINIVIL, ZESTRIL) 40 mg tablet Take 1 Tab by mouth daily.  metFORMIN (GLUCOPHAGE) 500 mg tablet Take 1 Tab by mouth daily (with breakfast).  LORazepam (ATIVAN) 2 mg tablet Take 1 Tab by mouth four (4) times daily. Max Daily Amount: 8 mg.  docusate sodium (COLACE) 100 mg capsule Take 100 mg by mouth two (2) times a day.  Cholecalciferol, Vitamin D3, 50,000 unit cap      No current facility-administered medications for this visit.          Past Medical History:   Diagnosis Date    Anxiety     Constipation     Difficulty swallowing pills     Headache     Hypertension     Muscle pain       Past Surgical History:   Procedure Laterality Date    HX CATARACT REMOVAL        Social History   Substance Use Topics    Smoking status: Current Every Day Smoker     Years: 50.00    Smokeless tobacco: Never Used    Alcohol use No      Family History   Problem Relation Age of Onset    Dementia Mother     Anxiety Mother     Pulmonary Embolism Father     Hypertension Father         Allergies   Allergen Reactions    Ciprofloxacin Unknown (comments)    Lidocaine Shortness of Breath    Pcn [Penicillins] Unknown (comments)    Shellfish Derived Hives    Sulfa (Sulfonamide Antibiotics) Unknown (comments)        Assessment/Plan  Kimberly Connell was seen today for blood pressure check. Diagnoses and all orders for this visit:    Essential hypertension - improvement with addition of low dose CCB. No s/e. Inquired about BB. Discussed that given underlying anxiety, will leave on. Continue ACEi. -     amLODIPine (NORVASC) 5 mg tablet; Take 1 Tab by mouth daily. -     metoprolol succinate (TOPROL-XL) 50 mg XL tablet; Take 1 Tab by mouth daily for 90 days. Anxiety - on very high dose benzos. Has not been able to get in with BS physch due to schedule. Has reached out to [facility] and will be seeing them after her 50th wedding anniversary trip. Explained that they may not change anything, but given dose of benzos, would like their input. Over 50% of a visit of 25 minutes spent reviewing diagnosis and treatment options and side effects of medicines. Follow-up Disposition:  Return in about 6 weeks (around 5/26/2017).     Jose Monroy MD  4/14/2017

## 2017-05-17 DIAGNOSIS — F41.9 ANXIETY: ICD-10-CM

## 2017-05-17 RX ORDER — METFORMIN HYDROCHLORIDE 500 MG/1
500 TABLET ORAL
Qty: 90 TAB | Refills: 1 | Status: CANCELLED | OUTPATIENT
Start: 2017-05-17

## 2017-05-18 ENCOUNTER — TELEPHONE (OUTPATIENT)
Dept: INTERNAL MEDICINE CLINIC | Age: 72
End: 2017-05-18

## 2017-05-18 NOTE — TELEPHONE ENCOUNTER
Per patient her LORazepam (ATIVAN) 2 mg tablet was not at the Pharmacy last night and she needs it called into the [facility] at [phone]    And her Metformin sent to her mail order [facility]

## 2017-05-19 RX ORDER — LORAZEPAM 2 MG/1
2 TABLET ORAL 4 TIMES DAILY
Qty: 120 TAB | Refills: 1 | OUTPATIENT
Start: 2017-05-19

## 2017-05-19 RX ORDER — METFORMIN HYDROCHLORIDE 500 MG/1
500 TABLET ORAL
Qty: 90 TAB | Refills: 1 | Status: SHIPPED | OUTPATIENT
Start: 2017-05-19 | End: 2017-08-21 | Stop reason: SDUPTHER

## 2017-06-02 ENCOUNTER — OFFICE VISIT (OUTPATIENT)
Dept: INTERNAL MEDICINE CLINIC | Age: 72
End: 2017-06-02

## 2017-06-02 VITALS
HEIGHT: 65 IN | SYSTOLIC BLOOD PRESSURE: 132 MMHG | TEMPERATURE: 97.9 F | HEART RATE: 78 BPM | WEIGHT: 165 LBS | OXYGEN SATURATION: 95 % | BODY MASS INDEX: 27.49 KG/M2 | DIASTOLIC BLOOD PRESSURE: 80 MMHG | RESPIRATION RATE: 12 BRPM

## 2017-06-02 DIAGNOSIS — Z13.39 SCREENING FOR ALCOHOLISM: ICD-10-CM

## 2017-06-02 DIAGNOSIS — Z72.0 TOBACCO ABUSE: ICD-10-CM

## 2017-06-02 DIAGNOSIS — Z51.81 ENCOUNTER FOR MEDICATION MONITORING: ICD-10-CM

## 2017-06-02 DIAGNOSIS — Z71.89 ADVANCED CARE PLANNING/COUNSELING DISCUSSION: ICD-10-CM

## 2017-06-02 DIAGNOSIS — Z00.00 ROUTINE GENERAL MEDICAL EXAMINATION AT A HEALTH CARE FACILITY: ICD-10-CM

## 2017-06-02 DIAGNOSIS — Z13.31 SCREENING FOR DEPRESSION: ICD-10-CM

## 2017-06-02 DIAGNOSIS — Z00.00 MEDICARE ANNUAL WELLNESS VISIT, SUBSEQUENT: Primary | ICD-10-CM

## 2017-06-02 DIAGNOSIS — I10 ESSENTIAL HYPERTENSION: ICD-10-CM

## 2017-06-02 DIAGNOSIS — F41.9 ANXIETY: ICD-10-CM

## 2017-06-02 RX ORDER — LORAZEPAM 2 MG/1
TABLET ORAL
Qty: 120 TAB | Refills: 2 | Status: SHIPPED | OUTPATIENT
Start: 2017-06-02 | End: 2017-06-02 | Stop reason: SDUPTHER

## 2017-06-02 RX ORDER — LORAZEPAM 2 MG/1
TABLET ORAL
Qty: 120 TAB | Refills: 2 | Status: SHIPPED | OUTPATIENT
Start: 2017-06-18 | End: 2017-09-01 | Stop reason: SDUPTHER

## 2017-06-02 NOTE — PATIENT INSTRUCTIONS
Medicare Part B Preventive Services Guidelines/Limitations Date last completed and Frequency Due Date   Bone Mass Measurement  (age 72 & older, biennial) Requires diagnosis related to osteoporosis or estrogen deficiency. Biennial benefit unless patient has history of long-term glucocorticoid tx or baseline is needed because initial test was by other method Completed greater than 5 years ago    Recommended every 2 years As recommended by your PCP or Specialist     Cardiovascular Screening Blood Tests (every 5 years)  Total cholesterol, HDL, Triglycerides Order as a panel if possible Completed 2/2017    As recommended by your PCP As recommended by your PCP or Specialist   Colorectal Cancer Screening  -Fecal occult blood test (annual)  -Flexible sigmoidoscopy (5y)  -Screening colonoscopy (10y)  -Barium Enema Age 49-80; After age [de-identified] if history of abnormal results Never received     Recommended every 5 to 10 years  Patient declined colonoscopy & at home FIT screening at this time. Counseling to Prevent Tobacco Use (up to 8 sessions per year)  - Counseling greater than 3 and up to 10 minutes  - Counseling greater than 10 minutes Patients must be asymptomatic of tobacco-related conditions to receive as preventive service N/A N/A   Diabetes Screening Tests (at least every 3 years, Medicare covers annually or at 6-month intervals for prediabetic patients)    Fasting blood sugar (FBS) or glucose tolerance test (GTT) Patient must be diagnosed with one of the following:  -Hypertension, Dyslipidemia, obesity, previous impaired FBS or GTT  Or any two of the following: overweight, FH of diabetes, age ? 72, history of gestational diabetes, birth of baby weighing more than 9 pounds Completed 2/2017    Recommended every 3 years for non-diabetics     As recommended by your PCP or Specialist     Glaucoma Screening (no USPSTF recommendation) Diabetes mellitus, family history, , age 48 or over,  American, age 72 or over Completed within the last year or two    Recommended annually As recommended by your PCP or Specialist   Seasonal Influenza Vaccination (annually)  Recommended Annually Patient declined   TDAP Vaccination  Recommended every 10 years Patient declined       Zoster (Shingles) Vaccination Covered by Medicare Part D through the pharmacy- PCP provides prescription Recommended once over age 48  Patient declined   Pneumococcal Vaccination (once after 72)  Pneumo 21-   Recommended once over the age of 72    Prevnar 15-  Recommended once over the age of 72 Patient declined        Patient declined   Screening Mammography (biennial age 54-69) Annually (age 36 or over) Completed greater than 5 years ago. As recommended by your PCP or Specialist     Screening Pap Tests and Pelvic Examination (up to age 79 and after 79 if unknown history or abnormal study last 10 years) Every 24 months except high risk As recommended by your PCP or Specialist   As recommended by your PCP or Specialist     Ultrasound Screening for Abdominal Aortic Aneurysm (AAA) (once) Patient must be referred through IPPE and not have had a screening for abdominal aortic aneurysm before under Medicare. Limited to patients who meet one of the following criteria:  - Men who are 73-68 years old and have smoked more than 100 cigarettes in their lifetime.  -Anyone with a FH of AAA  -Anyone recommended for screening by USPSTF Not indicated unless recommended by PCP   Not indicated unless recommended by PCP     Family Practice Management 2011    Please bring a copy of your completed advance medical directive to the office so it may be added to your medical record. Thank you. If you have any questions or concerns please feel free to contact me at [phone]. It was a pleasure meeting you today and participating in your healthcare.   Brinda Perkins RN

## 2017-06-02 NOTE — MR AVS SNAPSHOT
Visit Information Date & Time Provider Department Dept. Phone Encounter #  
 6/2/2017 11:15 AM Neris Vargas MD [facility] [address] [id number] Follow-up Instructions Return in about 3 months (around 9/2/2017). Upcoming Health Maintenance Date Due DTaP/Tdap/Td series (1 - Tdap) 9/28/1965 BREAST CANCER SCRN MAMMOGRAM 9/28/1994 FOBT Q 1 YEAR AGE 50-75 9/28/1994 ZOSTER VACCINE AGE 60> 9/28/2004 GLAUCOMA SCREENING Q2Y 9/28/2009 OSTEOPOROSIS SCREENING (DEXA) 9/28/2009 Pneumococcal 65+ Low/Medium Risk (1 of 2 - PCV13) 9/28/2009 INFLUENZA AGE 9 TO ADULT 8/1/2017 MEDICARE YEARLY EXAM 6/3/2018 Allergies as of 6/2/2017  Review Complete On: 6/2/2017 By: Irma Carballo LPN Severity Noted Reaction Type Reactions Ciprofloxacin  01/06/2017    Unknown (comments) Lidocaine  01/06/2017    Shortness of Breath Pcn [Penicillins]  01/06/2017    Unknown (comments) Shellfish Derived  04/14/2017    Hives Sulfa (Sulfonamide Antibiotics)  01/06/2017    Unknown (comments) Current Immunizations  Never Reviewed No immunizations on file. Not reviewed this visit You Were Diagnosed With   
  
 Codes Comments Anxiety    -  Primary ICD-10-CM: F41.9 ICD-9-CM: 300.00 Essential hypertension     ICD-10-CM: I10 
ICD-9-CM: 401.9 Tobacco abuse     ICD-10-CM: Z72.0 ICD-9-CM: 305.1 Encounter for medication monitoring     ICD-10-CM: Z51.81 
ICD-9-CM: V58.83 Vitals BP Pulse Temp Resp Height(growth percentile) Weight(growth percentile) 132/80 (BP 1 Location: Right arm, BP Patient Position: Sitting) 78 97.9 °F (36.6 °C) (Oral) 12 5' 5\" (1.651 m) 165 lb (74.8 kg) SpO2 BMI OB Status Smoking Status 95% 27.46 kg/m2 Postmenopausal Current Every Day Smoker Vitals History BMI and BSA Data Body Mass Index Body Surface Area  
 27.46 kg/m 2 1.85 m 2 Preferred Pharmacy Pharmacy Name Phone [facility], [place] - [address] [phone] Your Updated Medication List  
  
   
This list is accurate as of: 6/2/17 12:01 PM.  Always use your most recent med list. amLODIPine 5 mg tablet Commonly known as:  Sherald Burkitt Take 1 Tab by mouth daily. Cholecalciferol (Vitamin D3) 50,000 unit Cap COLACE 100 mg capsule Generic drug:  docusate sodium Take 100 mg by mouth two (2) times a day. lisinopril 40 mg tablet Commonly known as:  John Mash Take 1 Tab by mouth daily. LORazepam 2 mg tablet Commonly known as:  ATIVAN  
take 1 tablet by mouth four times a day Start taking on:  6/18/2017  
  
 metFORMIN 500 mg tablet Commonly known as:  GLUCOPHAGE Take 1 Tab by mouth daily (with breakfast). metoprolol succinate 50 mg XL tablet Commonly known as:  TOPROL-XL Take 1 Tab by mouth daily for 90 days. simvastatin 20 mg tablet Commonly known as:  ZOCOR Take 1 Tab by mouth nightly. Prescriptions Printed Refills LORazepam (ATIVAN) 2 mg tablet 2 Starting on: 6/18/2017 Sig: take 1 tablet by mouth four times a day Class: Print We Performed the Following 10-PANEL URINE DRUG SCREEN [[id number] Custom] Follow-up Instructions Return in about 3 months (around 9/2/2017). Patient Instructions Medicare Part B Preventive Services Guidelines/Limitations Date last completed and Frequency Due Date Bone Mass Measurement 
(age 72 & older, biennial) Requires diagnosis related to osteoporosis or estrogen deficiency. Biennial benefit unless patient has history of long-term glucocorticoid tx or baseline is needed because initial test was by other method Completed greater than 5 years ago Recommended every 2 years As recommended by your PCP or Specialist 
  
Cardiovascular Screening Blood Tests (every 5 years) Total cholesterol, HDL, Triglycerides Order as a panel if possible Completed 2/2017 As recommended by your PCP As recommended by your PCP or Specialist  
Colorectal Cancer Screening 
-Fecal occult blood test (annual) -Flexible sigmoidoscopy (5y) 
-Screening colonoscopy (10y) -Barium Enema Age 49-80; After age [de-identified] if history of abnormal results Never received Recommended every 5 to 10 years  Patient declined colonoscopy & at home FIT screening at this time. Counseling to Prevent Tobacco Use (up to 8 sessions per year) - Counseling greater than 3 and up to 10 minutes - Counseling greater than 10 minutes Patients must be asymptomatic of tobacco-related conditions to receive as preventive service N/A N/A Diabetes Screening Tests (at least every 3 years, Medicare covers annually or at 6-month intervals for prediabetic patients) Fasting blood sugar (FBS) or glucose tolerance test (GTT) Patient must be diagnosed with one of the following: 
-Hypertension, Dyslipidemia, obesity, previous impaired FBS or GTT 
Or any two of the following: overweight, FH of diabetes, age ? 72, history of gestational diabetes, birth of baby weighing more than 9 pounds Completed 2/2017 Recommended every 3 years for non-diabetics As recommended by your PCP or Specialist 
  
Glaucoma Screening (no USPSTF recommendation) Diabetes mellitus, family history, , age 48 or over,  American, age 72 or over Completed within the last year or two Recommended annually As recommended by your PCP or Specialist  
Seasonal Influenza Vaccination (annually)  Recommended Annually Patient declined TDAP Vaccination  Recommended every 10 years Patient declined Zoster (Shingles) Vaccination Covered by Medicare Part D through the pharmacy- PCP provides prescription Recommended once over age 48  Patient declined Pneumococcal Vaccination (once after 65)  Pneumo 23- Recommended once over the age of 72 
 
 Prevnar 13-  Recommended once over the age of 72 Patient declined Patient declined Screening Mammography (biennial age 54-69) Annually (age 36 or over) Completed greater than 5 years ago. As recommended by your PCP or Specialist 
  
Screening Pap Tests and Pelvic Examination (up to age 79 and after 79 if unknown history or abnormal study last 10 years) Every 24 months except high risk As recommended by your PCP or Specialist 
 As recommended by your PCP or Specialist 
  
Ultrasound Screening for Abdominal Aortic Aneurysm (AAA) (once) Patient must be referred through IPPE and not have had a screening for abdominal aortic aneurysm before under Medicare. Limited to patients who meet one of the following criteria: 
- Men who are 73-68 years old and have smoked more than 100 cigarettes in their lifetime. 
-Anyone with a FH of AAA 
-Anyone recommended for screening by USPSTF Not indicated unless recommended by PCP Not indicated unless recommended by PCP Family Practice Management 2011 Please bring a copy of your completed advance medical directive to the office so it may be added to your medical record. Thank you. If you have any questions or concerns please feel free to contact me at [phone]. It was a pleasure meeting you today and participating in your healthcare. Irving London RN Introducing [facility]! P[employer] Z[employer] introduces [employer] patient portal. Now you can access parts of your medical record, email your doctor's office, and request medication refills online. 1. In your internet browser, go to [email or web address] 2. Click on the First Time User? Click Here link in the Sign In box. You will see the New Member Sign Up page. 3. Enter your [employer] Access Code exactly as it appears below. You will not need to use this code after youve completed the sign-up process. If you do not sign up before the expiration date, you must request a new code. · "[employer] Access Code: [email or web address] Expires: 7/13/2017 11:57 AM 
 
4. Enter the last four digits of your Social Security Number (xxxx) and Date of Birth (mm/dd/yyyy) as indicated and click Submit. You will be taken to the next sign-up page. 5. Create a "[employer] ID. This will be your "[employer] login ID and cannot be changed, so think of one that is secure and easy to remember. 6. Create a "[employer] password. You can change your password at any time. 7. Enter your Password Reset Question and Answer. This can be used at a later time if you forget your password. 8. Enter your e-mail address. You will receive e-mail notification when new information is available in [address] 9. Click Sign Up. You can now view and download portions of your medical record. 10. Click the Download Summary menu link to download a portable copy of your medical information. If you have questions, please visit the Frequently Asked Questions section of the "[employer] website. Remember, "[employer] is NOT to be used for urgent needs. For medical emergencies, dial 911. Now available from your iPhone and Android! Please provide this summary of care documentation to your next provider. Your primary care clinician is listed as Nona Cooney. If you have any questions after today's visit, please call [phone].

## 2017-06-02 NOTE — PROGRESS NOTES
Matthew Sanchez is a 66 y.o. female who presents today for Follow-up (Pt here for 6 week f/u ) and Annual Wellness Visit  . She has a history of   Patient Active Problem List   Diagnosis Code    Essential hypertension I10    Mixed hyperlipidemia E78.2    Vitamin D deficiency E55.9    Pre-diabetes R73.03    Tobacco abuse Z72.0    Family history of abdominal aortic aneurysm (AAA) Z82.49    Anxiety F41.9    Advanced care planning/counseling discussion Z71.89   . Today patient is here for f/u. MW done today by our NN. Please refer to her note. she does have other concerns. She notes that her beast friend has stage IV cancer. Not doing well. Her  may be loosing his job. Pt had a good trip to [place]. Hypertension - A bit up initially today. On repeat better. Hypertension ROS: taking medications as instructed, no medication side effects noted, no TIA's, no chest pain on exertion, no dyspnea on exertion, no swelling of ankles     reports that she has been smoking. She has smoked for the past 50.00 years. She has never used smokeless tobacco.    reports that she does not drink alcohol. BP Readings from Last 2 Encounters:   06/02/17 132/80   04/14/17 128/83     Anxiety: chronic use of high dose benzos. Concerned about this as this is such a high dose. This has been on this for decades. Has made an appointment with Dr. Nakia Hall on [facility] [place] [address]. Appointment on August 3rd. Still taking QID ativan.  checked and last fill was 5/20. MW: due for multiple things. No c-scope or mammogram.   Immunizations: Refusing. ROS  Review of Systems   Constitutional: Negative for chills, fever and weight loss. HENT: Negative for congestion and sore throat. Eyes: Negative for blurred vision, double vision and photophobia. Respiratory: Negative for cough and shortness of breath. Cardiovascular: Negative for chest pain, palpitations and leg swelling.    Gastrointestinal: Negative for abdominal pain, constipation, diarrhea, heartburn, nausea and vomiting. Genitourinary: Negative for dysuria, frequency, hematuria and urgency. Musculoskeletal: Negative for joint pain and myalgias. Skin: Negative for rash. Neurological: Negative. Negative for headaches. Endo/Heme/Allergies: Does not bruise/bleed easily. Psychiatric/Behavioral: Negative for memory loss and suicidal ideas. The patient is nervous/anxious (Worse recently. ) and has insomnia. Visit Vitals    /80 (BP 1 Location: Right arm, BP Patient Position: Sitting)    Pulse 78    Temp 97.9 °F (36.6 °C) (Oral)    Resp 12    Ht 5' 5\" (1.651 m)    Wt 165 lb (74.8 kg)    SpO2 95%    BMI 27.46 kg/m2       Physical Exam   Constitutional: She is oriented to person, place, and time. She appears well-developed and well-nourished. HENT:   Head: Normocephalic and atraumatic. Eyes: EOM are normal. Pupils are equal, round, and reactive to light. Cardiovascular: Normal rate and regular rhythm. No murmur heard. Pulmonary/Chest: Breath sounds normal. No respiratory distress. Musculoskeletal: Normal range of motion. She exhibits no edema. Neurological: She is alert and oriented to person, place, and time. Skin: Skin is warm and dry. Psychiatric: Her behavior is normal.   Tearful today         Current Outpatient Prescriptions   Medication Sig    [START ON 6/18/2017] LORazepam (ATIVAN) 2 mg tablet take 1 tablet by mouth four times a day    metFORMIN (GLUCOPHAGE) 500 mg tablet Take 1 Tab by mouth daily (with breakfast).  amLODIPine (NORVASC) 5 mg tablet Take 1 Tab by mouth daily.  metoprolol succinate (TOPROL-XL) 50 mg XL tablet Take 1 Tab by mouth daily for 90 days.  simvastatin (ZOCOR) 20 mg tablet Take 1 Tab by mouth nightly.  lisinopril (PRINIVIL, ZESTRIL) 40 mg tablet Take 1 Tab by mouth daily.  docusate sodium (COLACE) 100 mg capsule Take 100 mg by mouth two (2) times a day.     Cholecalciferol, Vitamin D3, 50,000 unit cap      No current facility-administered medications for this visit. Past Medical History:   Diagnosis Date    Anxiety     Constipation     Difficulty swallowing pills     Headache     Hypertension     Muscle pain       Past Surgical History:   Procedure Laterality Date    HX CATARACT REMOVAL        Social History   Substance Use Topics    Smoking status: Current Every Day Smoker     Years: 50.00    Smokeless tobacco: Never Used    Alcohol use No      Family History   Problem Relation Age of Onset    Dementia Mother     Anxiety Mother     Pulmonary Embolism Father     Hypertension Father         Allergies   Allergen Reactions    Ciprofloxacin Unknown (comments)    Lidocaine Shortness of Breath    Pcn [Penicillins] Unknown (comments)    Shellfish Derived Hives    Sulfa (Sulfonamide Antibiotics) Unknown (comments)        Assessment/Plan  Marilia Pinon was seen today for follow-up and annual wellness visit. Diagnoses and all orders for this visit:    Medicare annual wellness visit, subsequent - See NN note. Refusing all screening tests. Urged her to review the recommendations and we can discuss these in the future. Essential hypertension - much improved with addition of CCB. Anxiety - refill very high dose benzos. Appt in august with psych. Very worried about changing regiment. Discussed given her current dose, need to have specialist involved in care. In addition has not seen psych in some time and needs to have evaluation for this for her benefit. Over 50% of a visit of 25 minutes spent reviewing diagnosis and treatment options and side effects of medicines. -     Discontinue: LORazepam (ATIVAN) 2 mg tablet; take 1 tablet by mouth four times a day  -     10-PANEL URINE DRUG SCREEN  -     LORazepam (ATIVAN) 2 mg tablet; take 1 tablet by mouth four times a day    Tobacco abuse - still smoking.      Encounter for medication monitoring  -     Discontinue: LORazepam (ATIVAN) 2 mg tablet; take 1 tablet by mouth four times a day  -     LORazepam (ATIVAN) 2 mg tablet; take 1 tablet by mouth four times a day    Routine general medical examination at a health care facility    Screening for alcoholism    Screening for depression    Advanced care planning/counseling discussion        Follow-up Disposition:  Return in about 3 months (around 9/2/2017).     Ari Holliday MD  6/2/2017

## 2017-06-02 NOTE — PROGRESS NOTES
Nurse Navigator Medicare Wellness Visit performed by MAYITO Landa RN    This is a Subsequent Matheus Visit providing Personalized Prevention Plan Services (PPPS) (Performed 12 months after initial AWV and PPPS )    I have reviewed the patient's medical history in detail and updated the computerized patient record. History     Past Medical History:   Diagnosis Date    Anxiety     Constipation     Difficulty swallowing pills     Headache     Hypertension     Muscle pain       Past Surgical History:   Procedure Laterality Date    HX CATARACT REMOVAL       Current Outpatient Prescriptions   Medication Sig Dispense Refill    [START ON 6/18/2017] LORazepam (ATIVAN) 2 mg tablet take 1 tablet by mouth four times a day 120 Tab 2    metFORMIN (GLUCOPHAGE) 500 mg tablet Take 1 Tab by mouth daily (with breakfast). 90 Tab 1    amLODIPine (NORVASC) 5 mg tablet Take 1 Tab by mouth daily. 90 Tab 1    metoprolol succinate (TOPROL-XL) 50 mg XL tablet Take 1 Tab by mouth daily for 90 days. 90 Tab 2    simvastatin (ZOCOR) 20 mg tablet Take 1 Tab by mouth nightly. 90 Tab 1    lisinopril (PRINIVIL, ZESTRIL) 40 mg tablet Take 1 Tab by mouth daily. 90 Tab 1    docusate sodium (COLACE) 100 mg capsule Take 100 mg by mouth two (2) times a day.       Cholecalciferol, Vitamin D3, 50,000 unit cap        Allergies   Allergen Reactions    Ciprofloxacin Unknown (comments)    Lidocaine Shortness of Breath    Pcn [Penicillins] Unknown (comments)    Shellfish Derived Hives    Sulfa (Sulfonamide Antibiotics) Unknown (comments)     Family History   Problem Relation Age of Onset    Dementia Mother     Anxiety Mother     Pulmonary Embolism Father     Hypertension Father      Social History   Substance Use Topics    Smoking status: Current Every Day Smoker     Years: 50.00    Smokeless tobacco: Never Used    Alcohol use No     Patient Active Problem List   Diagnosis Code    Essential hypertension I10    Mixed hyperlipidemia E78.2    Vitamin D deficiency E55.9    Pre-diabetes R73.03    Tobacco abuse Z72.0    Family history of abdominal aortic aneurysm (AAA) Z82.49    Anxiety F41.9    Advanced care planning/counseling discussion Z71.89       Depression Risk Factor Screening:   Patient denies feelings of being down, depressed or hopeless at this time. Patient states that they have a strong support system within their family & friends. Patient shares that she has a history of anxiety, which can be severe at times. Patient states that PCP is aware & is partnering with her to manage her anxiety medication. Patient adds that her anxiety has been heightened recently due to her best friend (from the age of 15) was recently diagnosed with stage IV colon cancer which has spread throughout her body. NN provided therapeutic listening. Patient adds that she has been working on getting an appointment with a psychiatrist per the request of PCP & this has been causing her anxiety & to feel 'jittery.' PCP notified. PHQ over the last two weeks 6/2/2017   Little interest or pleasure in doing things Not at all   Feeling down, depressed or hopeless Not at all   Total Score PHQ 2 0     Alcohol Risk Factor Screening: On any occasion during the past 3 months, have you had more than 3 drinks containing alcohol? No    Do you average more than 7 drinks per week? No      Functional Ability and Level of Safety:     Hearing Loss   normal-to-mild    Activities of Daily Living   Self-care. Patient states that she lives with her  in a private residence. Patient states independence in all ADLs & denies the use of assistive devices for ambulation. NN encouraged patient to continue and/ or introduce routine physical exercise into their daily routine as applicable & as recommended by PCP. Patient verbalized understanding & agreement to take this into consideration.  Patient shares that she does not drive by choice & her  assists with her transportation needs. Patient shares that she may have to begin driving again to be helpful to her aging . Requires assistance with:   ADL Assessment 6/2/2017   Feeding yourself No Help Needed   Getting from bed to chair No Help Needed   Getting dressed No Help Needed   Bathing or showering No Help Needed   Walk across the room (includes cane/walker) No Help Needed   Using the telphone No Help Needed   Taking your medications No Help Needed   Preparing meals No Help Needed   Managing money (expenses/bills) No Help Needed   Moderately strenuous housework (laundry) No Help Needed   Shopping for personal items (toiletries/medicines) No Help Needed   Shopping for groceries No Help Needed   Driving Help Needed   Climbing a flight of stairs No Help Needed   Getting to places beyond walking distances Help Needed       Fall Risk   Patient denies falls within the past year & verbalizes awareness of fall prevention strategies. Fall Risk Assessment, last 12 mths 6/2/2017   Able to walk? Yes   Fall in past 12 months? No     Abuse Screen   Patient is not abused     Abuse Screening Questionnaire 6/2/2017   Do you ever feel afraid of your partner? N   Are you in a relationship with someone who physically or mentally threatens you? N   Is it safe for you to go home? Y       Review of Systems   Medicare Wellness Visit    Physical Examination     Evaluation of Cognitive Function:  Mood/affect:  Neutral, anxious yet pleasant  Appearance: age appropriate and casually dressed  Family member/caregiver input: None present; however, patient reports a strong support system. No exam performed today, Medicare Wellness Visit.     Patient Care Team:  Danna Pillai MD as PCP - General (Internal Medicine)    Advice/Referrals/Counseling   Education and counseling provided:  End-of-Life planning (with patient's consent)  Pneumococcal Vaccine  Influenza Vaccine  Screening Mammography  Screening Pap and pelvic (covered once every 2 years)  Colorectal cancer screening tests  Bone mass measurement (DEXA)  Screening for glaucoma  tdap & shingles vaccinations      Assessment/Plan   1. Patient states conversation about Advanced Medical Directive has been started, but nothing written down yet. NN encouraged patient to complete Advanced Medical Directive paperwork & bring a copy to the office for scanning into the medical record. Patient verbalized understanding & agreement. 2. Patient declines all recommended immunizations (flu, tdap, shingles, pneumonia 23 & prevnar 13). Patient states that she has never received any of the recommended vaccinations due to her complex allergy history. Patient adds that she would rather take the risk of getting these illnesses versus the risk of an allergic reaction to the vaccine itself. PCP notified. NN informed patient of the benefits to receiving the recommended immunizations. Patient verbalized understanding, but once again respectfully declined. Patient's health maintenance immunization record has been updated & is current. 3. Patient states that it has been greater than 5 years since her last screening mammogram & screening dexa scan. PCP notified. Patient declined orders for these preventative tests today, but she may consider this again in the future. Patient admits that she has never completed a screening colonoscopy & patient declines order for a screening colonoscopy today, as well as declining the order for an at-home FIT colon screening. PCP notified. 4. Patient wears corrective lenses. Patient reports having a routine eye exam & glaucoma screening within the last year performed by an ophthalmologist at the [facility] in [place], [place]. [place] faxed requesting a copy of patient's last eye exam with glaucoma screening with patient's verbal approval.     5. Please see depression screening section for additional information.     Patient verbalized understanding of all information discussed. Patient was given the opportunity to ask questions. Medication reconciliation completed by MA/ LPN and reviewed by PCP. Patient provided AVS which includes Medicare Wellness Preventative Screening Table.

## 2017-06-03 LAB
AMPHETAMINES UR QL SCN: NEGATIVE NG/ML
BARBITURATES UR QL SCN: NEGATIVE NG/ML
BENZODIAZ UR QL: NEGATIVE NG/ML
BZE UR QL: NEGATIVE NG/ML
CANNABINOIDS UR QL SCN: NEGATIVE NG/ML
MDMA UR QL SCN: NEGATIVE NG/ML
METHADONE UR QL SCN: NEGATIVE NG/ML
METHAQUALONE UR QL: NEGATIVE NG/ML
OPIATES UR QL: NEGATIVE NG/ML
PCP UR QL: NEGATIVE NG/ML
PROPOXYPH UR QL: NEGATIVE NG/ML

## 2017-08-03 ENCOUNTER — TELEPHONE (OUTPATIENT)
Dept: INTERNAL MEDICINE CLINIC | Age: 72
End: 2017-08-03

## 2017-08-03 NOTE — TELEPHONE ENCOUNTER
Called by Psychiatrist Dr. Layla Jon,   Notes that he saw her today. She has been on long term very high dose benzos. This is concerning to him. Seems that he is not interested in helping wean her or continue this therapy. I explained that I wanted to have psychiatric input and agree that this is too much given risks with this. He suggested that she see Dr. Tj Granado who specializes in panic d/o at [facility]. Will make sure that she has received his referral.   Otherwise [facility] would be options.

## 2017-08-04 NOTE — TELEPHONE ENCOUNTER
Contacted Pt, she states she has been provided with Dr. Pedro Conteh phone number ([phone]). Pt states she has not contacted Dr. Power Conn yet d/t her husbands upcoming cataract surgeries in the next weeks. Pt asked if Dr. Rojas Christianson could decrease her Ativan to 1 mg QID instead of 2 mg QID. Advised Pt as per last OV note: \"Discussed given her current dose, need to have specialist involved in care\". Pt verbalized understanding and stated she would contact Dr. Pedro Conteh office to schedule appt.

## 2017-08-07 ENCOUNTER — TELEPHONE (OUTPATIENT)
Dept: INTERNAL MEDICINE CLINIC | Age: 72
End: 2017-08-07

## 2017-08-22 RX ORDER — METFORMIN HYDROCHLORIDE 500 MG/1
TABLET ORAL
Qty: 90 TAB | Refills: 1 | Status: SHIPPED | OUTPATIENT
Start: 2017-08-22 | End: 2018-02-21 | Stop reason: SDUPTHER

## 2017-09-01 ENCOUNTER — OFFICE VISIT (OUTPATIENT)
Dept: INTERNAL MEDICINE CLINIC | Age: 72
End: 2017-09-01

## 2017-09-01 VITALS
HEART RATE: 75 BPM | TEMPERATURE: 99 F | RESPIRATION RATE: 16 BRPM | SYSTOLIC BLOOD PRESSURE: 120 MMHG | WEIGHT: 167 LBS | DIASTOLIC BLOOD PRESSURE: 73 MMHG | OXYGEN SATURATION: 95 % | BODY MASS INDEX: 27.82 KG/M2 | HEIGHT: 65 IN

## 2017-09-01 DIAGNOSIS — I10 ESSENTIAL HYPERTENSION: ICD-10-CM

## 2017-09-01 DIAGNOSIS — F41.9 ANXIETY: ICD-10-CM

## 2017-09-01 DIAGNOSIS — E78.2 MIXED HYPERLIPIDEMIA: Primary | ICD-10-CM

## 2017-09-01 DIAGNOSIS — Z51.81 ENCOUNTER FOR MEDICATION MONITORING: ICD-10-CM

## 2017-09-01 RX ORDER — LORAZEPAM 2 MG/1
TABLET ORAL
Qty: 120 TAB | Refills: 2 | Status: CANCELLED | OUTPATIENT
Start: 2017-09-01

## 2017-09-01 RX ORDER — LISINOPRIL 40 MG/1
40 TABLET ORAL DAILY
Qty: 90 TAB | Refills: 1 | Status: SHIPPED | OUTPATIENT
Start: 2017-09-01 | End: 2018-02-21 | Stop reason: SDUPTHER

## 2017-09-01 RX ORDER — AMLODIPINE BESYLATE 5 MG/1
5 TABLET ORAL DAILY
Qty: 90 TAB | Refills: 1 | Status: SHIPPED | OUTPATIENT
Start: 2017-09-01 | End: 2018-02-21 | Stop reason: SDUPTHER

## 2017-09-01 RX ORDER — LORAZEPAM 2 MG/1
TABLET ORAL
Qty: 120 TAB | Refills: 2 | Status: SHIPPED | OUTPATIENT
Start: 2017-09-18 | End: 2018-02-21 | Stop reason: ALTCHOICE

## 2017-09-01 RX ORDER — LORAZEPAM 2 MG/1
TABLET ORAL
Qty: 120 TAB | Refills: 2 | Status: SHIPPED | OUTPATIENT
Start: 2017-09-01 | End: 2017-09-01 | Stop reason: SDUPTHER

## 2017-09-01 RX ORDER — SIMVASTATIN 20 MG/1
20 TABLET, FILM COATED ORAL
Qty: 90 TAB | Refills: 1 | Status: SHIPPED | OUTPATIENT
Start: 2017-09-01 | End: 2018-02-21 | Stop reason: SDUPTHER

## 2017-09-01 NOTE — PROGRESS NOTES
Everardo Brown is a 66 y.o. female who presents today for Medication Evaluation (Requests refill for lorazepam to go to [facility])  . She has a history of   Patient Active Problem List   Diagnosis Code    Essential hypertension I10    Mixed hyperlipidemia E78.2    Vitamin D deficiency E55.9    Pre-diabetes R73.03    Tobacco abuse Z72.0    Family history of abdominal aortic aneurysm (AAA) Z82.49    Anxiety F41.9    Advanced care planning/counseling discussion Z71.89   . Today patient is here for med check. Anxiety: chronic use of high dose benzos. Concerned about this as this is such a high dose. This has been on this for decades. Seen by Dr. Jerry Álvarez who agrees that this is too much, but referred to Dr. George Olsen 9/13/2017 who is more of a specialist in benzos. Still taking QID ativan. Has tried cutting back dosage, but having some issues with this.  checked and last fill was 8/19. Hypertension - On CCB, Toprol XL, ACEi. Hypertension ROS: taking medications as instructed, no medication side effects noted, no TIA's, no chest pain on exertion, no dyspnea on exertion, no swelling of ankles     reports that she has been smoking. She has smoked for the past 50.00 years. She has never used smokeless tobacco.    reports that she does not drink alcohol. BP Readings from Last 2 Encounters:   09/01/17 120/73   06/02/17 132/80         ROS  Review of Systems   Constitutional: Negative for chills, fever and weight loss. Respiratory: Negative for cough and shortness of breath. Cardiovascular: Negative for chest pain, palpitations and leg swelling. Gastrointestinal: Negative for abdominal pain, nausea and vomiting. Neurological: Negative. Endo/Heme/Allergies: Does not bruise/bleed easily. Psychiatric/Behavioral: Negative for depression, hallucinations, memory loss, substance abuse and suicidal ideas. The patient is nervous/anxious. The patient does not have insomnia.         Visit Vitals    /73 (BP 1 Location: Left arm, BP Patient Position: Sitting)    Pulse 75    Temp 99 °F (37.2 °C) (Oral)    Resp 16    Ht 5' 5\" (1.651 m)    Wt 167 lb (75.8 kg)    SpO2 95%    BMI 27.79 kg/m2       Physical Exam   Constitutional: She is oriented to person, place, and time. She appears well-developed and well-nourished. HENT:   Head: Normocephalic and atraumatic. Cardiovascular: Normal rate and regular rhythm. No murmur heard. Pulmonary/Chest: Effort normal. No respiratory distress. Neurological: She is alert and oriented to person, place, and time. Skin: Skin is warm and dry. Psychiatric: She has a normal mood and affect. Her behavior is normal.         Current Outpatient Prescriptions   Medication Sig    lisinopril (PRINIVIL, ZESTRIL) 40 mg tablet Take 1 Tab by mouth daily.  amLODIPine (NORVASC) 5 mg tablet Take 1 Tab by mouth daily.  simvastatin (ZOCOR) 20 mg tablet Take 1 Tab by mouth nightly.  [START ON 9/18/2017] LORazepam (ATIVAN) 2 mg tablet take 1 tablet by mouth four times a day    metFORMIN (GLUCOPHAGE) 500 mg tablet TAKE 1 TABLET EVERY DAY WITH BREAKFAST    metoprolol succinate (TOPROL-XL) 50 mg XL tablet Take 1 Tab by mouth daily for 90 days.  docusate sodium (COLACE) 100 mg capsule Take 100 mg by mouth two (2) times a day.  Cholecalciferol, Vitamin D3, 50,000 unit cap      No current facility-administered medications for this visit.          Past Medical History:   Diagnosis Date    Anxiety     Constipation     Difficulty swallowing pills     Headache     Hypertension     Muscle pain       Past Surgical History:   Procedure Laterality Date    HX CATARACT REMOVAL        Social History   Substance Use Topics    Smoking status: Current Every Day Smoker     Years: 50.00    Smokeless tobacco: Never Used    Alcohol use No      Family History   Problem Relation Age of Onset    Dementia Mother     Anxiety Mother     Pulmonary Embolism Father    [facility] Hypertension Father         Allergies   Allergen Reactions    Ciprofloxacin Unknown (comments)    Lidocaine Shortness of Breath    Pcn [Penicillins] Unknown (comments)    Shellfish Derived Hives    Sulfa (Sulfonamide Antibiotics) Unknown (comments)        Assessment/Plan  Diagnoses and all orders for this visit:    1. Mixed hyperlipidemia - stable. Refill  -     simvastatin (ZOCOR) 20 mg tablet; Take 1 Tab by mouth nightly. 2. Anxiety -  checked. Refill. Seeing psych this mos. Will f/u on his recs. -     LORazepam (ATIVAN) 2 mg tablet; take 1 tablet by mouth four times a day    3. Encounter for medication monitoring  -     LORazepam (ATIVAN) 2 mg tablet; take 1 tablet by mouth four times a day    4. Essential hypertension - refill.   -     lisinopril (PRINIVIL, ZESTRIL) 40 mg tablet; Take 1 Tab by mouth daily. -     amLODIPine (NORVASC) 5 mg tablet; Take 1 Tab by mouth daily. Follow-up Disposition:  Return in about 3 months (around 2017).     Luellen Apgar, MD  2017

## 2017-09-01 NOTE — MR AVS SNAPSHOT
Visit Information Date & Time Provider Department Dept. Phone Encounter #  
 9/1/2017 11:30 AM Don Johnston MD [facility] [address] [id number] Follow-up Instructions Return in about 3 months (around 12/1/2017). Upcoming Health Maintenance Date Due  
 BREAST CANCER SCRN MAMMOGRAM 9/28/1994 FOBT Q 1 YEAR AGE 50-75 9/28/1994 OSTEOPOROSIS SCREENING (DEXA) 9/28/2009 GLAUCOMA SCREENING Q2Y 4/26/2016 INFLUENZA AGE 9 TO ADULT 8/1/2017 Pneumococcal 65+ Low/Medium Risk (2 of 2 - PPSV23) 6/2/2018 MEDICARE YEARLY EXAM 6/3/2018 DTaP/Tdap/Td series (2 - Td) 6/2/2027 Allergies as of 9/1/2017  Review Complete On: 9/1/2017 By: Sabrina Sanchez LPN Severity Noted Reaction Type Reactions Ciprofloxacin  01/06/2017    Unknown (comments) Lidocaine  01/06/2017    Shortness of Breath Pcn [Penicillins]  01/06/2017    Unknown (comments) Shellfish Derived  04/14/2017    Hives Sulfa (Sulfonamide Antibiotics)  01/06/2017    Unknown (comments) Current Immunizations  Never Reviewed No immunizations on file. Not reviewed this visit You Were Diagnosed With   
  
 Codes Comments Mixed hyperlipidemia    -  Primary ICD-10-CM: R81.5 ICD-9-CM: 272.2 Anxiety     ICD-10-CM: F41.9 ICD-9-CM: 300.00 Encounter for medication monitoring     ICD-10-CM: Z51.81 
ICD-9-CM: V58.83 Essential hypertension     ICD-10-CM: I10 
ICD-9-CM: 401.9 Vitals BP Pulse Temp Resp Height(growth percentile) Weight(growth percentile) 120/73 (BP 1 Location: Left arm, BP Patient Position: Sitting) 75 99 °F (37.2 °C) (Oral) 16 5' 5\" (1.651 m) 167 lb (75.8 kg) SpO2 BMI OB Status Smoking Status 95% 27.79 kg/m2 Postmenopausal Current Every Day Smoker Vitals History BMI and BSA Data Body Mass Index Body Surface Area  
 27.79 kg/m 2 1.86 m 2 Preferred Pharmacy Pharmacy Name Phone [address] [phone] Your Updated Medication List  
  
   
This list is accurate as of: 9/1/17 12:48 PM.  Always use your most recent med list. amLODIPine 5 mg tablet Commonly known as:  Felisa Lino Take 1 Tab by mouth daily. Cholecalciferol (Vitamin D3) 50,000 unit Cap COLACE 100 mg capsule Generic drug:  docusate sodium Take 100 mg by mouth two (2) times a day. lisinopril 40 mg tablet Commonly known as:  Mechele Livings Take 1 Tab by mouth daily. LORazepam 2 mg tablet Commonly known as:  ATIVAN  
take 1 tablet by mouth four times a day  
  
 metFORMIN 500 mg tablet Commonly known as:  GLUCOPHAGE  
TAKE 1 TABLET EVERY DAY WITH BREAKFAST  
  
 metoprolol succinate 50 mg XL tablet Commonly known as:  TOPROL-XL Take 1 Tab by mouth daily for 90 days. simvastatin 20 mg tablet Commonly known as:  ZOCOR Take 1 Tab by mouth nightly. Prescriptions Printed Refills LORazepam (ATIVAN) 2 mg tablet 2 Sig: take 1 tablet by mouth four times a day Class: Print Prescriptions Sent to Pharmacy Refills  
 lisinopril (PRINIVIL, ZESTRIL) 40 mg tablet 1 Sig: Take 1 Tab by mouth daily. Class: Normal  
 Pharmacy: [address] [place] [address] Ph #: [phone] Route: Oral  
 amLODIPine (NORVASC) 5 mg tablet 1 Sig: Take 1 Tab by mouth daily. Class: Normal  
 Pharmacy: [address] [place] [address] Ph #: [phone] Route: Oral  
 simvastatin (ZOCOR) 20 mg tablet 1 Sig: Take 1 Tab by mouth nightly. Class: Normal  
 Pharmacy: [address] [place] [address] Ph #: [phone] Route: Oral  
  
Follow-up Instructions Return in about 3 months (around 12/1/2017). Introducing [facility]! Chelsey Peck introduces [employer] patient portal. Now you can access parts of your medical record, email your doctor's office, and request medication refills online. 1. In your internet browser, go to https://[email or web address]. [email or web address]/[employer] 2. Click on the First Time User? Click Here link in the Sign In box. You will see the New Member Sign Up page. 3. Enter your [employer] Access Code exactly as it appears below. You will not need to use this code after youve completed the sign-up process. If you do not sign up before the expiration date, you must request a new code. · [employer] Access Code: [email or web address] Expires: 11/30/2017 11:53 AM 
 
4. Enter the last four digits of your Social Security Number (xxxx) and Date of Birth (mm/dd/yyyy) as indicated and click Submit. You will be taken to the next sign-up page. 5. Create a [employer] ID. This will be your [employer] login ID and cannot be changed, so think of one that is secure and easy to remember. 6. Create a [employer] password. You can change your password at any time. 7. Enter your Password Reset Question and Answer. This can be used at a later time if you forget your password. 8. Enter your e-mail address. You will receive e-mail notification when new information is available in [address] 9. Click Sign Up. You can now view and download portions of your medical record. 10. Click the Download Summary menu link to download a portable copy of your medical information. If you have questions, please visit the Frequently Asked Questions section of the [employer] website. Remember, [employer] is NOT to be used for urgent needs. For medical emergencies, dial 911. Now available from your iPhone and Android! Please provide this summary of care documentation to your next provider. Your primary care clinician is listed as Nilo Reid. If you have any questions after today's visit, please call [phone].

## 2018-02-21 ENCOUNTER — OFFICE VISIT (OUTPATIENT)
Dept: INTERNAL MEDICINE CLINIC | Age: 73
End: 2018-02-21

## 2018-02-21 VITALS
HEART RATE: 68 BPM | HEIGHT: 65 IN | OXYGEN SATURATION: 94 % | SYSTOLIC BLOOD PRESSURE: 132 MMHG | TEMPERATURE: 98 F | WEIGHT: 165 LBS | RESPIRATION RATE: 18 BRPM | DIASTOLIC BLOOD PRESSURE: 82 MMHG | BODY MASS INDEX: 27.49 KG/M2

## 2018-02-21 DIAGNOSIS — B37.9 YEAST INFECTION: ICD-10-CM

## 2018-02-21 DIAGNOSIS — R73.01 IFG (IMPAIRED FASTING GLUCOSE): ICD-10-CM

## 2018-02-21 DIAGNOSIS — I10 ESSENTIAL HYPERTENSION: Primary | ICD-10-CM

## 2018-02-21 DIAGNOSIS — E78.5 HYPERLIPIDEMIA, UNSPECIFIED HYPERLIPIDEMIA TYPE: ICD-10-CM

## 2018-02-21 DIAGNOSIS — E78.2 MIXED HYPERLIPIDEMIA: ICD-10-CM

## 2018-02-21 DIAGNOSIS — E55.9 VITAMIN D DEFICIENCY: ICD-10-CM

## 2018-02-21 DIAGNOSIS — Z82.49 FAMILY HISTORY OF ABDOMINAL AORTIC ANEURYSM (AAA): ICD-10-CM

## 2018-02-21 DIAGNOSIS — Z72.0 TOBACCO ABUSE: ICD-10-CM

## 2018-02-21 DIAGNOSIS — F41.9 ANXIETY: ICD-10-CM

## 2018-02-21 RX ORDER — CLONAZEPAM 0.5 MG/1
TABLET ORAL
Refills: 0 | COMMUNITY
Start: 2018-02-17 | End: 2018-10-10 | Stop reason: SDUPTHER

## 2018-02-21 RX ORDER — METOPROLOL SUCCINATE 50 MG/1
50 TABLET, EXTENDED RELEASE ORAL DAILY
Qty: 90 TAB | Refills: 2 | Status: SHIPPED | OUTPATIENT
Start: 2018-02-21 | End: 2018-10-10 | Stop reason: SDUPTHER

## 2018-02-21 RX ORDER — AMLODIPINE BESYLATE 5 MG/1
5 TABLET ORAL DAILY
Qty: 90 TAB | Refills: 1 | Status: SHIPPED | OUTPATIENT
Start: 2018-02-21 | End: 2018-08-23 | Stop reason: SDUPTHER

## 2018-02-21 RX ORDER — NYSTATIN 100000 U/G
OINTMENT TOPICAL 2 TIMES DAILY
Qty: 15 G | Refills: 0 | Status: SHIPPED | OUTPATIENT
Start: 2018-02-21 | End: 2018-10-10

## 2018-02-21 RX ORDER — NYSTATIN 100000 U/G
OINTMENT TOPICAL 2 TIMES DAILY
Qty: 15 G | Refills: 0 | Status: SHIPPED | OUTPATIENT
Start: 2018-02-21 | End: 2018-02-21 | Stop reason: SDUPTHER

## 2018-02-21 RX ORDER — LORAZEPAM 1 MG/1
1 TABLET ORAL DAILY
Refills: 0 | COMMUNITY
Start: 2018-02-19 | End: 2019-02-21

## 2018-02-21 RX ORDER — SIMVASTATIN 20 MG/1
20 TABLET, FILM COATED ORAL
Qty: 90 TAB | Refills: 1 | Status: SHIPPED | OUTPATIENT
Start: 2018-02-21 | End: 2018-08-23 | Stop reason: SDUPTHER

## 2018-02-21 RX ORDER — NYSTATIN 100000 [USP'U]/G
POWDER TOPICAL 4 TIMES DAILY
Qty: 60 G | Refills: 3 | Status: SHIPPED | OUTPATIENT
Start: 2018-02-21 | End: 2018-10-10

## 2018-02-21 RX ORDER — NYSTATIN 100000 [USP'U]/G
POWDER TOPICAL 4 TIMES DAILY
Qty: 60 G | Refills: 3 | Status: SHIPPED | OUTPATIENT
Start: 2018-02-21 | End: 2018-02-21 | Stop reason: SDUPTHER

## 2018-02-21 RX ORDER — METFORMIN HYDROCHLORIDE 500 MG/1
TABLET ORAL
Qty: 90 TAB | Refills: 1 | Status: SHIPPED | OUTPATIENT
Start: 2018-02-21 | End: 2018-08-23 | Stop reason: SDUPTHER

## 2018-02-21 RX ORDER — LISINOPRIL 40 MG/1
40 TABLET ORAL DAILY
Qty: 90 TAB | Refills: 1 | Status: SHIPPED | OUTPATIENT
Start: 2018-02-21 | End: 2018-08-23 | Stop reason: SDUPTHER

## 2018-02-21 NOTE — MR AVS SNAPSHOT
[address] [facility] 
 
 
 [address] [place] [address] [facility] 
[phone] Patient: Flex Andrade MRN: [id number] [facility]:[id number] Visit Information Date & Time Provider Department Dept. Phone Encounter #  
 2/21/2018 10:45 AM Giuliana Campbell MD [facility] [address] [facility] [id number] Follow-up Instructions Return in about 6 months (around 8/21/2018) for Medicare Wellness. Upcoming Health Maintenance Date Due  
 BREAST CANCER SCRN MAMMOGRAM 9/28/1994 FOBT Q 1 YEAR AGE 50-75 9/28/1994 OSTEOPOROSIS SCREENING (DEXA) 9/28/2009 GLAUCOMA SCREENING Q2Y 4/26/2016 Pneumococcal 65+ Low/Medium Risk (2 of 2 - PPSV23) 6/2/2018 MEDICARE YEARLY EXAM 6/3/2018 DTaP/Tdap/Td series (2 - Td) 6/2/2027 Allergies as of 2/21/2018  Review Complete On: 2/21/2018 By: Giuliana Campbell MD  
  
 Severity Noted Reaction Type Reactions Ciprofloxacin  01/06/2017    Unknown (comments) Lidocaine  01/06/2017    Shortness of Breath Pcn [Penicillins]  01/06/2017    Unknown (comments) Shellfish Derived  04/14/2017    Hives Sulfa (Sulfonamide Antibiotics)  01/06/2017    Unknown (comments) Current Immunizations  Never Reviewed No immunizations on file. Not reviewed this visit You Were Diagnosed With   
  
 Codes Comments Essential hypertension    -  Primary ICD-10-CM: I10 
ICD-9-CM: 401.9 Anxiety     ICD-10-CM: F41.9 ICD-9-CM: 300.00 Vitamin D deficiency     ICD-10-CM: E55.9 ICD-9-CM: 268.9 Hyperlipidemia, unspecified hyperlipidemia type     ICD-10-CM: E78.5 ICD-9-CM: 272.4 Tobacco abuse     ICD-10-CM: Z72.0 ICD-9-CM: 305.1 Family history of abdominal aortic aneurysm (AAA)     ICD-10-CM: Z82.49 
ICD-9-CM: V17.49 IFG (impaired fasting glucose)     ICD-10-CM: R73.01 
ICD-9-CM: 790.21 Yeast infection     ICD-10-CM: B37.9 ICD-9-CM: 112.9 Mixed hyperlipidemia     ICD-10-CM: E78.2 ICD-9-CM: 272.2 Vitals BP Pulse Temp Resp Height(growth percentile) Weight(growth percentile) 132/82 (BP 1 Location: Left arm, BP Patient Position: Sitting) 68 98 °F (36.7 °C) (Oral) 18 5' 5\" (1.651 m) 165 lb (74.8 kg) SpO2 BMI OB Status Smoking Status 94% 27.46 kg/m2 Postmenopausal Current Every Day Smoker Vitals History BMI and BSA Data Body Mass Index Body Surface Area  
 27.46 kg/m 2 1.85 m 2 [facility] [address] [phone] Your Updated Medication List  
  
   
This list is accurate as of 2/21/18 11:21 AM.  Always use your most recent med list. amLODIPine 5 mg tablet Commonly known as:  Arva Brazen Take 1 Tab by mouth daily. clonazePAM 0.5 mg tablet Commonly known as:  KlonoPIN  
  
 COLACE 100 mg capsule Generic drug:  docusate sodium Take 100 mg by mouth two (2) times a day. lisinopril 40 mg tablet Commonly known as:  Marlane Tao Take 1 Tab by mouth daily. LORazepam 1 mg tablet Commonly known as:  ATIVAN  
  
 metFORMIN 500 mg tablet Commonly known as:  GLUCOPHAGE  
TAKE 1 TABLET EVERY DAY WITH BREAKFAST  
  
 metoprolol succinate 50 mg XL tablet Commonly known as:  TOPROL-XL Take 1 Tab by mouth daily for 90 days. * nystatin powder Commonly known as:  MYCOSTATIN Apply  to affected area four (4) times daily. * nystatin 100,000 unit/gram ointment Commonly known as:  MYCOSTATIN Apply  to affected area two (2) times a day. simvastatin 20 mg tablet Commonly known as:  ZOCOR Take 1 Tab by mouth nightly. * Notice: This list has 2 medication(s) that are the same as other medications prescribed for you. Read the directions carefully, and ask your doctor or other care provider to review them with you. Prescriptions Printed Refills nystatin (MYCOSTATIN) powder 3 Sig: Apply  to affected area four (4) times daily. Class: Print Route: Topical  
 nystatin (MYCOSTATIN) 100,000 unit/gram ointment 0 Sig: Apply  to affected area two (2) times a day. Class: Print Route: Topical  
  
Prescriptions Sent to Pharmacy Refills  
 lisinopril (PRINIVIL, ZESTRIL) 40 mg tablet 1 Sig: Take 1 Tab by mouth daily. Class: Normal  
 Pharmacy: [address] [place] [address] Ph #: [phone] Route: Oral  
 amLODIPine (NORVASC) 5 mg tablet 1 Sig: Take 1 Tab by mouth daily. Class: Normal  
 Pharmacy: [address] [place] [address] Ph #: [phone] Route: Oral  
 simvastatin (ZOCOR) 20 mg tablet 1 Sig: Take 1 Tab by mouth nightly. Class: Normal  
 Pharmacy: [address] [place] [address] Ph #: [phone] Route: Oral  
 metFORMIN (GLUCOPHAGE) 500 mg tablet 1 Sig: TAKE 1 TABLET EVERY DAY WITH BREAKFAST Class: Normal  
 Pharmacy: [address] [place] [address] Ph #: [phone]  
 metoprolol succinate (TOPROL-XL) 50 mg XL tablet 2 Sig: Take 1 Tab by mouth daily for 90 days. Class: Normal  
 Pharmacy: [address] [place] [address] Ph #: [phone] Route: Oral  
  
We Performed the Following CBC W/O DIFF [06760 CPT(R)] HEMOGLOBIN A1C WITH EAG [52768 CPT(R)] LIPID PANEL [06549 CPT(R)] METABOLIC PANEL, COMPREHENSIVE [82397 CPT(R)] URINALYSIS W/ RFLX MICROSCOPIC [63892 CPT(R)] VITAMIN D, 25 HYDROXY [id number] CPT(R)] Follow-up Instructions Return in about 6 months (around 8/21/2018) for Medicare Wellness. Introducing [facility]!    
 [facility] introduces [employer] patient portal. Now you can access parts of your medical record, email your doctor's office, and request medication refills online. 1. In your internet browser, go to [email or web address] 2. Click on the First Time User? Click Here link in the Sign In box. You will see the New Member Sign Up page. 3. Enter your h[employer] Access Code exactly as it appears below. You will not need to use this code after youve completed the sign-up process. If you do not sign up before the expiration date, you must request a new code. · [employer] Access Code: [email or web address] Expires: 5/22/2018 11:19 AM 
 
4. Enter the last four digits of your Social Security Number (xxxx) and Date of Birth (mm/dd/yyyy) as indicated and click Submit. You will be taken to the next sign-up page. 5. Create a hulu ID. This will be your hulu login ID and cannot be changed, so think of one that is secure and easy to remember. 6. Create a hulu password. You can change your password at any time. 7. Enter your Password Reset Question and Answer. This can be used at a later time if you forget your password. 8. Enter your e-mail address. You will receive e-mail notification when new information is available in [address] 9. Click Sign Up. You can now view and download portions of your medical record. 10. Click the Download Summary menu link to download a portable copy of your medical information. If you have questions, please visit the Frequently Asked Questions section of the hulu website. Remember, [employer] is NOT to be used for urgent needs. For medical emergencies, dial 911. Now available from your iPhone and Android! Please provide this summary of care documentation to your next provider. Your primary care clinician is listed as Nany Payan. If you have any questions after today's visit, please call [phone].

## 2018-02-21 NOTE — PROGRESS NOTES
Jose Vargas is a 67 y.o. female who presents today for Hypertension; Cholesterol Problem; Anxiety; Medication Evaluation; and Rash  . She has a history of   Patient Active Problem List   Diagnosis Code    Essential hypertension I10    Mixed hyperlipidemia E78.2    Vitamin D deficiency E55.9    Pre-diabetes R73.03    Tobacco abuse Z72.0    Family history of abdominal aortic aneurysm (AAA) Z82.49    Anxiety F41.9    Advanced care planning/counseling discussion Z71.89   . Today patient is here for f/u.   she does not have other concerns. Rash: yeast infection below bra line. Will give Nystatin. Anxiety: has been seeing Dr. Jose Castanon who is working with her on reducing her benzo amounts. Adding Clonazepam to redue need for lorazepam. Has cut back her lorazepam dose. Seeing him next week. Hypertension - On CCB, BB, ACEi. Stable. Hypertension ROS: taking medications as instructed, no medication side effects noted, no TIA's, no chest pain on exertion, no dyspnea on exertion, no swelling of ankles     reports that she has been smoking. She has smoked for the past 50.00 years. She has never used smokeless tobacco.    reports that she does not drink alcohol. BP Readings from Last 2 Encounters:   02/21/18 132/82   09/01/17 120/73     Hyperlipidemia  Currently she takes 20 mg of zocor. ROS: taking medications as instructed, no medication side effects noted  No new myalgias, no joint pains, no weakness  No TIA's, no chest pain on exertion, no dyspnea on exertion, no swelling of ankles. Lab Results   Component Value Date/Time    Cholesterol, total 121 02/24/2017 09:03 AM    HDL Cholesterol 39 (L) 02/24/2017 09:03 AM    LDL, calculated 54 02/24/2017 09:03 AM    VLDL, calculated 28 02/24/2017 09:03 AM    Triglyceride 141 02/24/2017 09:03 AM     Still smoking about 2.5ppd. ROS  Review of Systems   Constitutional: Negative for chills, fever and weight loss.    Eyes: Negative for blurred vision, double vision and photophobia. Respiratory: Negative for cough, hemoptysis, sputum production and shortness of breath. Cardiovascular: Negative for chest pain, palpitations, orthopnea, claudication and leg swelling. Gastrointestinal: Negative for abdominal pain, constipation, diarrhea, heartburn, nausea and vomiting. Genitourinary: Negative for dysuria, frequency, hematuria and urgency. Musculoskeletal: Negative for back pain, myalgias and neck pain. Skin: Positive for itching and rash. Neurological: Negative. Endo/Heme/Allergies: Does not bruise/bleed easily. Psychiatric/Behavioral: Negative for depression and suicidal ideas. The patient is not nervous/anxious. Visit Vitals    /82 (BP 1 Location: Left arm, BP Patient Position: Sitting)    Pulse 68    Temp 98 °F (36.7 °C) (Oral)    Resp 18    Ht 5' 5\" (1.651 m)    Wt 165 lb (74.8 kg)    SpO2 94%    BMI 27.46 kg/m2       Physical Exam   Constitutional: She is oriented to person, place, and time. She appears well-developed and well-nourished. HENT:   Head: Normocephalic and atraumatic. Cardiovascular: Normal rate and regular rhythm. No murmur heard. Pulmonary/Chest: Effort normal. No respiratory distress. Neurological: She is alert and oriented to person, place, and time. Skin: Skin is warm and dry. Yeast infection below bra line. Psychiatric: She has a normal mood and affect. Her behavior is normal.         Current Outpatient Prescriptions   Medication Sig    LORazepam (ATIVAN) 1 mg tablet     clonazePAM (KLONOPIN) 0.5 mg tablet     lisinopril (PRINIVIL, ZESTRIL) 40 mg tablet Take 1 Tab by mouth daily.  amLODIPine (NORVASC) 5 mg tablet Take 1 Tab by mouth daily.  simvastatin (ZOCOR) 20 mg tablet Take 1 Tab by mouth nightly.  metFORMIN (GLUCOPHAGE) 500 mg tablet TAKE 1 TABLET EVERY DAY WITH BREAKFAST    metoprolol succinate (TOPROL-XL) 50 mg XL tablet Take 1 Tab by mouth daily for 90 days.  nystatin (MYCOSTATIN) powder Apply  to affected area four (4) times daily.  nystatin (MYCOSTATIN) 100,000 unit/gram ointment Apply  to affected area two (2) times a day.  docusate sodium (COLACE) 100 mg capsule Take 100 mg by mouth two (2) times a day. No current facility-administered medications for this visit. Past Medical History:   Diagnosis Date    Anxiety     Constipation     Difficulty swallowing pills     Headache     Hypertension     Muscle pain       Past Surgical History:   Procedure Laterality Date    HX CATARACT REMOVAL        Social History   Substance Use Topics    Smoking status: Current Every Day Smoker     Years: 50.00    Smokeless tobacco: Never Used    Alcohol use No      Family History   Problem Relation Age of Onset    Dementia Mother     Anxiety Mother     Pulmonary Embolism Father     Hypertension Father         Allergies   Allergen Reactions    Ciprofloxacin Unknown (comments)    Lidocaine Shortness of Breath    Pcn [Penicillins] Unknown (comments)    Shellfish Derived Hives    Sulfa (Sulfonamide Antibiotics) Unknown (comments)        Assessment/Plan  Diagnoses and all orders for this visit:    1. Essential hypertension - stable continue current meds. -     METABOLIC PANEL, COMPREHENSIVE  -     CBC W/O DIFF  -     lisinopril (PRINIVIL, ZESTRIL) 40 mg tablet; Take 1 Tab by mouth daily. -     amLODIPine (NORVASC) 5 mg tablet; Take 1 Tab by mouth daily. -     metoprolol succinate (TOPROL-XL) 50 mg XL tablet; Take 1 Tab by mouth daily for 90 days. 2. Anxiety - seeing psych. Trying to wean benzos. 3. Vitamin D deficiency - repeat  -     VITAMIN D, 25 HYDROXY    4. Hyperlipidemia, unspecified hyperlipidemia type  -     LIPID PANEL  -     METABOLIC PANEL, COMPREHENSIVE  -     CBC W/O DIFF    5. Tobacco abuse - counseled. She will try to start the gum.   -     URINALYSIS W/ RFLX MICROSCOPIC    6.  Family history of abdominal aortic aneurysm (AAA) - notes normal US <2 yrs ago. Will re-request records. 7. IFG (impaired fasting glucose)  -     HEMOGLOBIN A1C WITH EAG    8. Yeast infection - ointment to treat then PRN powder. -     nystatin (MYCOSTATIN) powder; Apply  to affected area four (4) times daily. -     nystatin (MYCOSTATIN) 100,000 unit/gram ointment; Apply  to affected area two (2) times a day. 9. Mixed hyperlipidemia  -     simvastatin (ZOCOR) 20 mg tablet; Take 1 Tab by mouth nightly. Other orders  -     metFORMIN (GLUCOPHAGE) 500 mg tablet; TAKE 1 TABLET EVERY DAY WITH BREAKFAST        Follow-up Disposition:  Return in about 6 months (around 8/21/2018) for Medicare Wellness.     Dada Canales MD  2/21/2018

## 2018-02-22 DIAGNOSIS — E55.9 VITAMIN D DEFICIENCY: Primary | ICD-10-CM

## 2018-02-22 LAB
25(OH)D3+25(OH)D2 SERPL-MCNC: 13.2 NG/ML (ref 30–100)
ALBUMIN SERPL-MCNC: 4.3 G/DL (ref 3.5–4.8)
ALBUMIN/GLOB SERPL: 2 {RATIO} (ref 1.2–2.2)
ALP SERPL-CCNC: 43 IU/L (ref 39–117)
ALT SERPL-CCNC: 11 IU/L (ref 0–32)
APPEARANCE UR: CLEAR
AST SERPL-CCNC: 14 IU/L (ref 0–40)
BILIRUB SERPL-MCNC: 0.4 MG/DL (ref 0–1.2)
BILIRUB UR QL STRIP: NEGATIVE
BUN SERPL-MCNC: 8 MG/DL (ref 8–27)
BUN/CREAT SERPL: 11 (ref 12–28)
CALCIUM SERPL-MCNC: 9.6 MG/DL (ref 8.7–10.3)
CHLORIDE SERPL-SCNC: 100 MMOL/L (ref 96–106)
CHOLEST SERPL-MCNC: 129 MG/DL (ref 100–199)
CO2 SERPL-SCNC: 23 MMOL/L (ref 18–29)
COLOR UR: YELLOW
CREAT SERPL-MCNC: 0.76 MG/DL (ref 0.57–1)
ERYTHROCYTE [DISTWIDTH] IN BLOOD BY AUTOMATED COUNT: 14.2 % (ref 12.3–15.4)
EST. AVERAGE GLUCOSE BLD GHB EST-MCNC: 114 MG/DL
GFR SERPLBLD CREATININE-BSD FMLA CKD-EPI: 78 ML/MIN/{1.73_M2}
GFR SERPLBLD CREATININE-BSD FMLA CKD-EPI: 90 ML/MIN/{1.73_M2}
GLOBULIN SER CALC-MCNC: 2.2 G/L (ref 1.5–4.5)
GLUCOSE SERPL-MCNC: 98 MG/DL (ref 65–99)
GLUCOSE UR QL: NEGATIVE
HBA1C MFR BLD: 5.6 % (ref 4.8–5.6)
HCT VFR BLD AUTO: 43.8 % (ref 34–46.6)
HDLC SERPL-MCNC: 39 MG/DL
HGB BLD-MCNC: 15.1 G/DL (ref 11.1–15.9)
HGB UR QL STRIP: NEGATIVE
INTERPRETATION, 910389: NORMAL
KETONES UR QL STRIP: NEGATIVE
LDLC SERPL CALC-MCNC: 59 MG/DL (ref 0–99)
LEUKOCYTE ESTERASE UR QL STRIP: NEGATIVE
MCH RBC QN AUTO: 31.9 PG (ref 26.6–33)
MCHC RBC AUTO-ENTMCNC: 34.5 G/DL (ref 31.5–35.7)
MCV RBC AUTO: 92 FL (ref 79–97)
MICRO URNS: NORMAL
NITRITE UR QL STRIP: NEGATIVE
PH UR STRIP: 6 [PH] (ref 5–7.5)
PLATELET # BLD AUTO: 213 X10E3/UL (ref 150–379)
POTASSIUM SERPL-SCNC: 4.2 MMOL/L (ref 3.5–5.2)
PROT SERPL-MCNC: 6.5 G/DL (ref 6–8.5)
PROT UR QL STRIP: NEGATIVE
RBC # BLD AUTO: 4.74 X10E6/UL (ref 3.77–5.28)
SODIUM SERPL-SCNC: 141 MMOL/L (ref 134–144)
SP GR UR: 1.01 (ref 1–1.03)
TRIGL SERPL-MCNC: 154 MG/DL (ref 0–149)
UROBILINOGEN UR STRIP-MCNC: 0.2 MG/DL (ref 0.2–1)
VLDLC SERPL CALC-MCNC: 31 MG/DL (ref 5–40)
WBC # BLD AUTO: 8 X10E3/UL (ref 3.4–10.8)

## 2018-02-22 RX ORDER — ERGOCALCIFEROL 1.25 MG/1
50000 CAPSULE ORAL
Qty: 12 CAP | Refills: 3 | Status: SHIPPED | OUTPATIENT
Start: 2018-02-22 | End: 2018-10-10 | Stop reason: SDUPTHER

## 2018-03-02 ENCOUNTER — DOCUMENTATION ONLY (OUTPATIENT)
Dept: INTERNAL MEDICINE CLINIC | Age: 73
End: 2018-03-02

## 2018-03-02 NOTE — PROGRESS NOTES
Received medical records from [facility]. Records have been placed on Dr Pascal's desk for review.

## 2018-08-23 DIAGNOSIS — E78.2 MIXED HYPERLIPIDEMIA: ICD-10-CM

## 2018-08-23 DIAGNOSIS — I10 ESSENTIAL HYPERTENSION: ICD-10-CM

## 2018-08-23 RX ORDER — AMLODIPINE BESYLATE 5 MG/1
TABLET ORAL
Qty: 90 TAB | Refills: 1 | Status: SHIPPED | OUTPATIENT
Start: 2018-08-23 | End: 2019-02-21 | Stop reason: SDUPTHER

## 2018-08-23 RX ORDER — LISINOPRIL 40 MG/1
TABLET ORAL
Qty: 90 TAB | Refills: 1 | Status: SHIPPED | OUTPATIENT
Start: 2018-08-23 | End: 2019-02-21 | Stop reason: SDUPTHER

## 2018-08-23 RX ORDER — METFORMIN HYDROCHLORIDE 500 MG/1
TABLET ORAL
Qty: 90 TAB | Refills: 1 | Status: SHIPPED | OUTPATIENT
Start: 2018-08-23 | End: 2019-08-21

## 2018-08-23 RX ORDER — SIMVASTATIN 20 MG/1
TABLET, FILM COATED ORAL
Qty: 90 TAB | Refills: 1 | Status: SHIPPED | OUTPATIENT
Start: 2018-08-23 | End: 2019-02-21 | Stop reason: SDUPTHER

## 2018-10-10 ENCOUNTER — OFFICE VISIT (OUTPATIENT)
Dept: INTERNAL MEDICINE CLINIC | Age: 73
End: 2018-10-10

## 2018-10-10 VITALS
DIASTOLIC BLOOD PRESSURE: 82 MMHG | HEART RATE: 64 BPM | HEIGHT: 65 IN | OXYGEN SATURATION: 96 % | WEIGHT: 164 LBS | SYSTOLIC BLOOD PRESSURE: 128 MMHG | BODY MASS INDEX: 27.32 KG/M2 | TEMPERATURE: 98.2 F | RESPIRATION RATE: 16 BRPM

## 2018-10-10 DIAGNOSIS — F41.9 ANXIETY: ICD-10-CM

## 2018-10-10 DIAGNOSIS — R73.01 IFG (IMPAIRED FASTING GLUCOSE): ICD-10-CM

## 2018-10-10 DIAGNOSIS — I10 ESSENTIAL HYPERTENSION: ICD-10-CM

## 2018-10-10 DIAGNOSIS — Z00.00 MEDICARE ANNUAL WELLNESS VISIT, SUBSEQUENT: Primary | ICD-10-CM

## 2018-10-10 DIAGNOSIS — E78.2 MIXED HYPERLIPIDEMIA: ICD-10-CM

## 2018-10-10 DIAGNOSIS — E55.9 VITAMIN D DEFICIENCY: ICD-10-CM

## 2018-10-10 DIAGNOSIS — B37.9 YEAST INFECTION: ICD-10-CM

## 2018-10-10 DIAGNOSIS — Z72.0 TOBACCO ABUSE: ICD-10-CM

## 2018-10-10 RX ORDER — ERGOCALCIFEROL 1.25 MG/1
50000 CAPSULE ORAL
Qty: 12 CAP | Refills: 3 | Status: SHIPPED | OUTPATIENT
Start: 2018-10-10 | End: 2019-08-21 | Stop reason: SDUPTHER

## 2018-10-10 RX ORDER — KETOCONAZOLE 20 MG/G
CREAM TOPICAL DAILY
Qty: 15 G | Refills: 1 | Status: SHIPPED | OUTPATIENT
Start: 2018-10-10 | End: 2018-12-10 | Stop reason: SDUPTHER

## 2018-10-10 RX ORDER — CLONAZEPAM 1 MG/1
TABLET ORAL
Refills: 0 | COMMUNITY
Start: 2018-09-11

## 2018-10-10 RX ORDER — METOPROLOL SUCCINATE 50 MG/1
50 TABLET, EXTENDED RELEASE ORAL DAILY
Qty: 90 TAB | Refills: 2 | Status: SHIPPED | OUTPATIENT
Start: 2018-10-10 | End: 2019-02-21 | Stop reason: SDUPTHER

## 2018-10-10 NOTE — MR AVS SNAPSHOT
[facility] 
 
 
 [address] [place] [address] [place] [address] [facility] 
[phone] Patient: Taffy Ganser MRN: [id number] MDM:9/99/2748 Visit Information Date & Time Provider Department Dept. Phone Encounter #  
 10/10/2018 11:30 AM Haydee Hoskins MD [facility] [address] [facility] [id number] Follow-up Instructions Return in about 6 months (around 4/10/2019). Upcoming Health Maintenance Date Due COLONOSCOPY 9/28/1962 Shingrix Vaccine Age 50> (1 of 2) 9/28/1994 BREAST CANCER SCRN MAMMOGRAM 9/28/1994 Bone Densitometry (Dexa) Screening 9/28/2009 GLAUCOMA SCREENING Q2Y 4/26/2016 MEDICARE YEARLY EXAM 6/3/2018 Influenza Age 5 to Adult 8/1/2018 Pneumococcal 65+ Low/Medium Risk (2 of 2 - PPSV23) 10/10/2019* DTaP/Tdap/Td series (2 - Td) 6/2/2027 *Topic was postponed. The date shown is not the original due date. Allergies as of 10/10/2018  Review Complete On: 89/24/6086 By: Tj Moody Severity Noted Reaction Type Reactions Ciprofloxacin  01/06/2017    Unknown (comments) Lidocaine  01/06/2017    Shortness of Breath Pcn [Penicillins]  01/06/2017    Unknown (comments) Shellfish Derived  04/14/2017    Hives Sulfa (Sulfonamide Antibiotics)  01/06/2017    Unknown (comments) Current Immunizations  Never Reviewed No immunizations on file. Not reviewed this visit You Were Diagnosed With   
  
 Codes Comments Essential hypertension    -  Primary ICD-10-CM: I10 
ICD-9-CM: 401.9 Anxiety     ICD-10-CM: F41.9 ICD-9-CM: 300.00 Mixed hyperlipidemia     ICD-10-CM: E78.2 ICD-9-CM: 272.2 Vitamin D deficiency     ICD-10-CM: E55.9 ICD-9-CM: 268.9 Tobacco abuse     ICD-10-CM: Z72.0 ICD-9-CM: 305.1 IFG (impaired fasting glucose)     ICD-10-CM: R73.01 
ICD-9-CM: 790.21 Yeast infection     ICD-10-CM: B37.9 ICD-9-CM: 112.9 Vitals BP Pulse Temp Resp Height(growth percentile) Weight(growth percentile) 182/82 64 98.2 °F (36.8 °C) (Oral) 16 5' 5\" (1.651 m) 164 lb (74.4 kg) SpO2 BMI OB Status Smoking Status 96% 27.29 kg/m2 Postmenopausal Current Every Day Smoker Vitals History BMI and BSA Data Body Mass Index Body Surface Area  
 27.29 kg/m 2 1.85 m 2 Preferred Pharmacy [facility] Phone [place] [id number] [address] [place] [address] [phone] Your Updated Medication List  
  
   
This list is accurate as of 10/10/18 12:36 PM.  Always use your most recent med list. amLODIPine 5 mg tablet Commonly known as:  Sonia Schwalbe TAKE 1 TABLET EVERY DAY  
  
 clonazePAM 1 mg tablet Commonly known as:  KlonoPIN  
take 1 tablet by mouth twice a day COLACE 100 mg capsule Generic drug:  docusate sodium Take 100 mg by mouth two (2) times a day. ergocalciferol 50,000 unit capsule Commonly known as:  ERGOCALCIFEROL Take 1 Cap by mouth every seven (7) days. ketoconazole 2 % topical cream  
Commonly known as:  NIZORAL Apply  to affected area daily. lisinopril 40 mg tablet Commonly known as:  PRINIVIL, ZESTRIL  
TAKE 1 TABLET EVERY DAY  
  
 LORazepam 1 mg tablet Commonly known as:  ATIVAN Take 1 mg by mouth daily. metFORMIN 500 mg tablet Commonly known as:  GLUCOPHAGE  
TAKE 1 TABLET EVERY DAY WITH BREAKFAST  
  
 metoprolol succinate 50 mg XL tablet Commonly known as:  TOPROL-XL Take 1 Tab by mouth daily for 90 days. simvastatin 20 mg tablet Commonly known as:  ZOCOR  
TAKE 1 TABLET EVERY NIGHT Prescriptions Sent to Pharmacy Refills  
 ketoconazole (NIZORAL) 2 % topical cream 1 Sig: Apply  to affected area daily. Class: Normal  
 Pharmacy: [id number] [address] [place] [address], [address] [facility] Ph #: [phone]  Route: Topical  
 ergocalciferol (ERGOCALCIFEROL) 50,000 unit capsule 3 Sig: Take 1 Cap by mouth every seven (7) days. Class: Normal  
 Pharmacy: [address] [place] [address] Ph #: [phone] Route: Oral  
 metoprolol succinate (TOPROL-XL) 50 mg XL tablet 2 Sig: Take 1 Tab by mouth daily for 90 days. Class: Normal  
 Pharmacy: [address] [place] [address] Ph #: [phone] Route: Oral  
  
We Performed the Following HEMOGLOBIN A1C WITH EAG [93870 CPT(R)] LIPID PANEL [48669 CPT(R)] METABOLIC PANEL, COMPREHENSIVE [27474 CPT(R)] URINALYSIS W/ RFLX MICROSCOPIC [23037 CPT(R)] VITAMIN D, 25 HYDROXY [id number] CPT(R)] Follow-up Instructions Return in about 6 months (around 4/10/2019). Patient Instructions Medicare Part B Preventive Services Limitations Recommendation Scheduled Bone Mass Measurement 
(age 72 & older, biennial) Requires diagnosis related to osteoporosis or estrogen deficiency. Biennial benefit unless patient has history of long-term glucocorticoid tx or baseline is needed because initial test was by other method Last: A DEXA scan is recommended after you turn 72years of age to determine your risk for osteoporosis Next: States that she had one years ago and everything was good Colorectal Cancer Screening 
-Fecal occult blood test (annual) -Flexible sigmoidoscopy (5y) 
-Screening colonoscopy (10y) -Barium Enema  Last: 
 
Every 5-10 years depending on your colonoscopy result starting at age 48 years Declines all of it Glaucoma Screening (no USPSTF recommendation) Diabetes mellitus, family history, , age 48 or over,  American, age 72 or over Last: 4 years ago Every year Next: Knows she is due for that Screening Mammography (biennial age 54-69) Annually (age 36 or over) Last: many years ago Declines Screening Pap Tests and Pelvic Examination (up to age 72 and after 72 if unknown history or abnormal study last 10 years) Every 24 months except high risk Last: Next: 
 
Discuss with your doctor if this screening is appropriate for you. Cardiovascular Screening Blood Tests (every 5 years) Total cholesterol, HDL, Triglycerides Order as a panel if possible Last: 2/2018 Every Year Next: 2/2019 Diabetes Screening Tests (at least every 3 years, Medicare covers annually or at 6-month intervals for prediabetic patients) Fasting blood sugar (FBS) or glucose tolerance test (GTT) Patient must be diagnosed with one of the following: 
-Hypertension, Dyslipidemia, obesity, previous impaired FBS or GTT 
Or any two of the following: overweight, FH of diabetes, age ? 72, history of gestational diabetes, birth of baby weighing more than 9 pounds Last: 2/2018 Every 3-6 months depending on your result Every 3 years Next: 2/2019 or as recommended Diabetes Self-Management Training (DSMT) (no USPSTF recommendation) Requires referral by treating physician for patient with diabetes or renal disease. 10 hours of initial DSMT session of no less than 30 minutes each in a continuous 12-month period. 2 hours of follow-up DSMT in subsequent years. Last: Talk to your doctor if you are interested in a refresher course. Medical Nutrition Therapy (MNT) (for diabetes or renal disease not recommended schedule) Requires referral by treating physician for patient with diabetes or renal disease. Can be provided in same year as diabetes self-management training (DSMT), and CMS recommends medical nutrition therapy take place after DSMT. Up to 3 hours for initial year and 2 hours in subsequent years. Last: NA Talk to your doctor if you are interested in a refresher course. Seasonal Influenza Vaccination (annually)  Last:  
 
Every Fall Declines Pneumococcal Vaccination (once after 65)  Last: 
Pneumovax: 
 
Prevnar-13: 
 Declines Shingles Vaccination  Last: A shingles vaccine is recommended once in a lifetime after age 61 Declines Tetanus, Diphtheria and Pertussis (Tdap) Vaccination Booster One Booster as an adult and then tetanus every 10 years or as indicated Last: Declines Hepatitis B Vaccinations (if medium/high risk) Medium/high risk factors:  End-stage renal disease, Hemophiliacs who received Factor VIII or IX concentrates, Clients of institutions for the mentally retarded, Persons who live in the same house as a HepB virus carrier, Homosexual men, Illicit injectable drug abusers. Last: NA Next: NA  
Counseling to Prevent Tobacco Use (up to 8 sessions per year) - Counseling greater than 3 and up to 10 minutes - Counseling greater than 10 minutes Patients must be asymptomatic of tobacco-related conditions to receive as preventive service Last: not interested Next: not interested in additional counseling at this time Human Immunodeficiency Virus (HIV) Screening (annually for increased risk patients) HIV-1 and HIV-2 by EIA, DONNA, rapid antibody test, or oral mucosa transudate Patient must be at increased risk for HIV infection per USPSTF guidelines or pregnant. Tests covered annually for patients at increased risk. Pregnant patients may receive up to 3 test during pregnancy. Last: NA Next: NA Ultrasound Screening for Abdominal Aortic Aneurysm (AAA) once Patient must be referred through IPPE and not have had a screening for abdominal aortic aneurysm before under medicare. Limited to patients who meet onf of the following criteria: 
-Men who are 73-68 years old and have smoked more than 100 cigarettes in their lifetime 
-Anyone with a FH of AAA 
-Anyone recommended for screening by the USPSFTF As recommended by your PCP or Specialist 
 As recommended by your PCP or Specialist 
  
NA = Not Applicable; NI= Not Indicated 1) Read over the advanced care planning paperwork and consider filling out and bringing back to future appointment. 2) Schedule an eye exam 
 
 
  
Introducing [facility]! [facility] introduces Just [employer] patient portal. Now you can access parts of your medical record, email your doctor's office, and request medication refills online. 1. In your internet browser, go to [email or web address] 2. Click on the First Time User? Click Here link in the Sign In box. You will see the New Member Sign Up page. 3. Enter your Just [employer] Access Code exactly as it appears below. You will not need to use this code after youve completed the sign-up process. If you do not sign up before the expiration date, you must request a new code. · Just Eat Access Code: [email or web address] Expires: 1/8/2019 12:36 PM 
 
4. Enter the last four digits of your Social Security Number (xxxx) and Date of Birth (mm/dd/yyyy) as indicated and click Submit. You will be taken to the next sign-up page. 5. Create a Just Eat ID. This will be your Just Eat login ID and cannot be changed, so think of one that is secure and easy to remember. 6. Create a Just Eat password. You can change your password at any time. 7. Enter your Password Reset Question and Answer. This can be used at a later time if you forget your password. 8. Enter your e-mail address. You will receive e-mail notification when new information is available in [address] 9. Click Sign Up. You can now view and download portions of your medical record. 10. Click the Download Summary menu link to download a portable copy of your medical information. If you have questions, please visit the Frequently Asked Questions section of the Just Eat website. Remember, Just Eat is NOT to be used for urgent needs. For medical emergencies, dial 911. Now available from your iPhone and Android! Please provide this summary of care documentation to your next provider. Your primary care clinician is listed as Maddy Jade. If you have any questions after today's visit, please call [phone].

## 2018-10-10 NOTE — PATIENT INSTRUCTIONS
Medicare Part B Preventive Services Limitations Recommendation Scheduled Bone Mass Measurement 
(age 72 & older, biennial) Requires diagnosis related to osteoporosis or estrogen deficiency. Biennial benefit unless patient has history of long-term glucocorticoid tx or baseline is needed because initial test was by other method Last: A DEXA scan is recommended after you turn 72years of age to determine your risk for osteoporosis Next: States that she had one years ago and everything was good Colorectal Cancer Screening 
-Fecal occult blood test (annual) -Flexible sigmoidoscopy (5y) 
-Screening colonoscopy (10y) -Barium Enema  Last: 
 
Every 5-10 years depending on your colonoscopy result starting at age 48 years Declines all of it Glaucoma Screening (no USPSTF recommendation) Diabetes mellitus, family history, , age 48 or over,  American, age 72 or over Last: 4 years ago Every year Next: Knows she is due for that Screening Mammography (biennial age 54-69) Annually (age 36 or over) Last: many years ago Declines Screening Pap Tests and Pelvic Examination (up to age 72 and after 72 if unknown history or abnormal study last 10 years) Every 24 months except high risk Last: Next: 
 
Discuss with your doctor if this screening is appropriate for you. Cardiovascular Screening Blood Tests (every 5 years) Total cholesterol, HDL, Triglycerides Order as a panel if possible Last: 2/2018 Every Year Next: 2/2019 Diabetes Screening Tests (at least every 3 years, Medicare covers annually or at 6-month intervals for prediabetic patients) Fasting blood sugar (FBS) or glucose tolerance test (GTT) Patient must be diagnosed with one of the following: 
-Hypertension, Dyslipidemia, obesity, previous impaired FBS or GTT 
Or any two of the following: overweight, FH of diabetes, age ? 72, history of gestational diabetes, birth of baby weighing more than 9 pounds Last: 2/2018 Every 3-6 months depending on your result Every 3 years Next: 2/2019 or as recommended Diabetes Self-Management Training (DSMT) (no USPSTF recommendation) Requires referral by treating physician for patient with diabetes or renal disease. 10 hours of initial DSMT session of no less than 30 minutes each in a continuous 12-month period. 2 hours of follow-up DSMT in subsequent years. Last: Talk to your doctor if you are interested in a refresher course. Medical Nutrition Therapy (MNT) (for diabetes or renal disease not recommended schedule) Requires referral by treating physician for patient with diabetes or renal disease. Can be provided in same year as diabetes self-management training (DSMT), and CMS recommends medical nutrition therapy take place after DSMT. Up to 3 hours for initial year and 2 hours in subsequent years. Last: NA Talk to your doctor if you are interested in a refresher course. Seasonal Influenza Vaccination (annually)  Last:  
 
Every Fall Declines Pneumococcal Vaccination (once after 65)  Last: 
Pneumovax: 
 
Prevnar-13: 
 Declines Shingles Vaccination  Last: A shingles vaccine is recommended once in a lifetime after age 61 Declines Tetanus, Diphtheria and Pertussis (Tdap) Vaccination Booster One Booster as an adult and then tetanus every 10 years or as indicated Last: Declines Hepatitis B Vaccinations (if medium/high risk) Medium/high risk factors:  End-stage renal disease, Hemophiliacs who received Factor VIII or IX concentrates, Clients of institutions for the mentally retarded, Persons who live in the same house as a HepB virus carrier, Homosexual men, Illicit injectable drug abusers. Last: NA Next: NA  
Counseling to Prevent Tobacco Use (up to 8 sessions per year) - Counseling greater than 3 and up to 10 minutes - Counseling greater than 10 minutes Patients must be asymptomatic of tobacco-related conditions to receive as preventive service Last: not interested Next: not interested in additional counseling at this time Human Immunodeficiency Virus (HIV) Screening (annually for increased risk patients) HIV-1 and HIV-2 by EIA, DONNA, rapid antibody test, or oral mucosa transudate Patient must be at increased risk for HIV infection per USPSTF guidelines or pregnant. Tests covered annually for patients at increased risk. Pregnant patients may receive up to 3 test during pregnancy. Last: NA Next: NA Ultrasound Screening for Abdominal Aortic Aneurysm (AAA) once Patient must be referred through IPPE and not have had a screening for abdominal aortic aneurysm before under medicare. Limited to patients who meet onf of the following criteria: 
-Men who are 73-68 years old and have smoked more than 100 cigarettes in their lifetime 
-Anyone with a FH of AAA 
-Anyone recommended for screening by the USPSFTF As recommended by your PCP or Specialist 
 As recommended by your PCP or Specialist 
  
NA = Not Applicable; NI= Not Indicated 1) Read over the advanced care planning paperwork and consider filling out and bringing back to future appointment.  
 
2) Schedule an eye exam

## 2018-10-10 NOTE — PROGRESS NOTES
Dr. Chinmay Levy referred Margie Rivas, 1945, a 75 y.o. female for a Medicare Annual Wellness Visit (AWV) This is the Subsequent Medicare Annual Wellness Exam, performed 12 months or more after the Initial AWV or the last Subsequent AWV I have reviewed the patient's medical history in detail and updated the computerized patient record. History Past Medical History:  
Diagnosis Date  Anxiety  Constipation  Difficulty swallowing pills  Headache  Hypertension  Muscle pain Past Surgical History:  
Procedure Laterality Date  HX CATARACT REMOVAL Current Outpatient Prescriptions Medication Sig Dispense Refill  clonazePAM (KLONOPIN) 1 mg tablet take 1 tablet by mouth twice a day  0  
 ketoconazole (NIZORAL) 2 % topical cream Apply  to affected area daily. 15 g 1  
 ergocalciferol (ERGOCALCIFEROL) 50,000 unit capsule Take 1 Cap by mouth every seven (7) days. 12 Cap 3  
 metoprolol succinate (TOPROL-XL) 50 mg XL tablet Take 1 Tab by mouth daily for 90 days. 90 Tab 2  
 amLODIPine (NORVASC) 5 mg tablet TAKE 1 TABLET EVERY DAY 90 Tab 1  
 metFORMIN (GLUCOPHAGE) 500 mg tablet TAKE 1 TABLET EVERY DAY WITH BREAKFAST 90 Tab 1  
 simvastatin (ZOCOR) 20 mg tablet TAKE 1 TABLET EVERY NIGHT 90 Tab 1  
 lisinopril (PRINIVIL, ZESTRIL) 40 mg tablet TAKE 1 TABLET EVERY DAY 90 Tab 1  
 LORazepam (ATIVAN) 1 mg tablet Take 1 mg by mouth daily. 0  
 docusate sodium (COLACE) 100 mg capsule Take 100 mg by mouth two (2) times a day. Allergies Allergen Reactions  Ciprofloxacin Unknown (comments)  Lidocaine Shortness of Breath  Pcn [Penicillins] Unknown (comments)  Shellfish Derived Hives  Sulfa (Sulfonamide Antibiotics) Unknown (comments) Family History Problem Relation Age of Onset  Dementia Mother  Anxiety Mother  Pulmonary Embolism Father  Hypertension Father Social History Substance Use Topics  Smoking status: Current Every Day Smoker Packs/day: 1.50 Years: 50.00  Smokeless tobacco: Never Used  Alcohol use No  
 
Patient Active Problem List  
Diagnosis Code  Essential hypertension I10  
 Mixed hyperlipidemia E78.2  Vitamin D deficiency E55.9  Pre-diabetes R73.03  
 Tobacco abuse Z72.0  Family history of abdominal aortic aneurysm (AAA) Z82.49  
 Anxiety F41.9  Advanced care planning/counseling discussion Z71.89 Depression Risk Factor Screening: PHQ over the last two weeks 10/10/2018 Little interest or pleasure in doing things Not at all Feeling down, depressed, irritable, or hopeless Several days Total Score PHQ 2 1 Trouble falling or staying asleep, or sleeping too much Not at all Feeling tired or having little energy Not at all Poor appetite, weight loss, or overeating Not at all Feeling bad about yourself - or that you are a failure or have let yourself or your family down Not at all Trouble concentrating on things such as school, work, reading, or watching TV Not at all Moving or speaking so slowly that other people could have noticed; or the opposite being so fidgety that others notice Not at all Thoughts of being better off dead, or hurting yourself in some way Not at all PHQ 9 Score 1 How difficult have these problems made it for you to do your work, take care of your home and get along with others Not difficult at all States her mood has improved since being on the new regimen of clonazepam BID. She states that her anxiety has decreased and overall she is just feeling better. Alcohol Risk Factor Screening: You do not drink alcohol or very rarely. Functional Ability and Level of Safety:  
Hearing Loss Hearing is good. Activities of Daily Living The home contains: no safety equipment. Patient does total self care ADL Assessment 10/10/2018 Feeding yourself No Help Needed Getting from bed to chair No Help Needed Getting dressed No Help Needed Bathing or showering No Help Needed Walk across the room (includes cane/walker) No Help Needed Using the telphone No Help Needed Taking your medications No Help Needed Preparing meals No Help Needed Managing money (expenses/bills) No Help Needed Moderately strenuous housework (laundry) No Help Needed Shopping for personal items (toiletries/medicines) No Help Needed Shopping for groceries No Help Needed Driving No Help Needed Climbing a flight of stairs No Help Needed Getting to places beyond walking distances No Help Needed Fall Risk Fall Risk Assessment, last 12 mths 10/10/2018 Able to walk? Yes Fall in past 12 months? No  
 
 
Abuse Screen Patient is not abused Cognitive Screening Evaluation of Cognitive Function: 
Has your family/caregiver stated any concerns about your memory: no 
Normal 
 
Patient Care Team  
Patient Care Team: 
Kristie Resendiz MD as PCP - General (Internal Medicine) Pedro Baptiste MD as Physician (Psychiatry) Assessment/Plan Education and counseling provided: 
Are appropriate based on today's review and evaluation End-of-Life planning (with patient's consent) Pneumococcal Vaccine Influenza Vaccine Hepatitis B Vaccine Screening Mammography Screening Pap and pelvic (covered once every 2 years) Colorectal cancer screening tests Cardiovascular screening blood test 
Bone mass measurement (DEXA) Screening for glaucoma Diabetes screening test 
Tdap and Shingrix Diagnoses and all orders for this visit: 1. Essential hypertension 
-     metoprolol succinate (TOPROL-XL) 50 mg XL tablet; Take 1 Tab by mouth daily for 90 days. 2. Anxiety 3. Mixed hyperlipidemia -     LIPID PANEL 
-     METABOLIC PANEL, COMPREHENSIVE 4. Vitamin D deficiency 
-     VITAMIN D, 25 HYDROXY 
-     ergocalciferol (ERGOCALCIFEROL) 50,000 unit capsule;  Take 1 Cap by mouth every seven (7) days. 5. Tobacco abuse 
-     URINALYSIS W/ RFLX MICROSCOPIC 6. IFG (impaired fasting glucose) 
-     HEMOGLOBIN A1C WITH EAG 
 
7. Yeast infection 
-     ketoconazole (NIZORAL) 2 % topical cream; Apply  to affected area daily. Health Maintenance Due Topic Date Due  
 COLONOSCOPY  09/28/1962  Shingrix Vaccine Age 50> (1 of 2) 09/28/1994  BREAST CANCER SCRN MAMMOGRAM  09/28/1994  Bone Densitometry (Dexa) Screening  09/28/2009  GLAUCOMA SCREENING Q2Y  04/26/2016  MEDICARE YEARLY EXAM  06/03/2018  Influenza Age 5 to Adult  08/01/2018 Timothy El Medicare Annual Wellness Visit: 
----Immunizations: Patient declines all vaccines. She understands the risk associated with not vaccinating and is still not interested. ----Screenings: See chart in patient instructions for specific information. ADL's, Fall Risk, Depression Screening and Abuse screening information is reported above. Patient declines all screenings including dexa, mammo, and colonoscopy. She does state that she will consider getting an eye exam as it has been ~4 years since her last one. Labs up to date. Patient is not interested in smoking cessation information at this time.  
 
----Medication Reconciliation was performed today and the following changes were made: 
Medications Discontinued During This Encounter Medication Reason  clonazePAM (KLONOPIN) 0.5 mg tablet Duplicate Order  nystatin (MYCOSTATIN) 100,000 unit/gram ointment Not A Current Medication  nystatin (MYCOSTATIN) powder Not A Current Medication  ergocalciferol (ERGOCALCIFEROL) 50,000 unit capsule Reorder  metoprolol succinate (TOPROL-XL) 50 mg XL tablet Reorder  
 
----Advanced Care Plan: Patient educated on the importance of an advanced care plan and paperwork was given to the patient today. Asked patient to read over the information and bring it back to her next appointment with her physician. Patient verbalized understanding of information presented. Answered all of the patient's questions. AVS handed and reviewed with patient which includes a Medicare Wellness Preventative Screening Table and patient specific information. Notification of recommendations will be sent to Dr. Kristie Resendiz for review.  
 
Dariela Nguyen, PharmD, BCPS, CDE

## 2018-10-10 NOTE — PROGRESS NOTES
Curry Shelton is a 75 y.o. female who presents today for Hypertension; Cholesterol Problem; Vitamin D Deficiency; Anxiety; and Annual Wellness Visit [facility] She has a history of  
Patient Active Problem List  
Diagnosis Code  Essential hypertension I10  
 Mixed hyperlipidemia E78.2  Vitamin D deficiency E55.9  Pre-diabetes R73.03  
 Tobacco abuse Z72.0  Family history of abdominal aortic aneurysm (AAA) Z82.49  
 Anxiety F41.9  Advanced care planning/counseling discussion Z71.89 [facility] Today patient is here for f/u. Anxiety: has been seeing Dr. Britt Bentley who is working with her on reducing her benzo amounts. Adding Clonazepam to redue need for lorazepam, down to 1mg. On BID klonopin. Feeling very well. Anxiety is much better. Hypertension - Stable on current meds. Hypertension ROS: taking medications as instructed, no medication side effects noted, no TIA's, no chest pain on exertion, no dyspnea on exertion, no swelling of ankles     reports that she has been smoking. She has a 75.00 pack-year smoking history. She has never used smokeless tobacco.    reports that she does not drink alcohol. BP Readings from Last 2 Encounters:  
10/10/18 128/82  
02/21/18 132/82 Hyperlipidemia - repeat. Currently she takes 20 mg of simvastatin. ROS: taking medications as instructed, no medication side effects noted No new myalgias, no joint pains, no weakness No TIA's, no chest pain on exertion, no dyspnea on exertion, no swelling of ankles. Lab Results Component Value Date/Time Cholesterol, total 129 02/21/2018 11:38 AM  
 HDL Cholesterol 39 (L) 02/21/2018 11:38 AM  
 LDL, calculated 59 02/21/2018 11:38 AM  
 VLDL, calculated 31 02/21/2018 11:38 AM  
 Triglyceride 154 (H) 02/21/2018 11:38 AM  
 
HM: pt declining all HM. Still quite active. Still having mild  irritation. No discharge. ROS Review of Systems Constitutional: Negative for chills, fever and weight loss. Eyes: Negative for blurred vision, double vision, photophobia and pain. Respiratory: Positive for cough. Negative for hemoptysis, sputum production and shortness of breath. Cardiovascular: Negative for chest pain, palpitations, orthopnea, claudication and leg swelling. Gastrointestinal: Negative for abdominal pain, nausea and vomiting. Genitourinary: Negative for dysuria, frequency and urgency. Musculoskeletal: Negative for back pain, myalgias and neck pain. Skin: Positive for itching. Negative for rash. Neurological: Negative. Negative for dizziness, tingling, tremors and headaches. Endo/Heme/Allergies: Does not bruise/bleed easily. Psychiatric/Behavioral: Negative for depression. The patient is nervous/anxious (Much better. ). Visit Vitals  /82  Pulse 64  Temp 98.2 °F (36.8 °C) (Oral)  Resp 16  
 Ht 5' 5\" (1.651 m)  Wt 164 lb (74.4 kg)  SpO2 96%  BMI 27.29 kg/m2 Physical Exam  
Constitutional: She is oriented to person, place, and time. She appears well-developed and well-nourished. HENT:  
Head: Normocephalic and atraumatic. Right Ear: External ear normal.  
Left Ear: External ear normal.  
Mouth/Throat: Oropharynx is clear and moist.  
Eyes: EOM are normal. Pupils are equal, round, and reactive to light. Neck: Normal range of motion. Neck supple. Cardiovascular: Normal rate and regular rhythm. No murmur heard. Pulmonary/Chest: Effort normal and breath sounds normal. No respiratory distress. Distant but normal   
Abdominal: Soft. Bowel sounds are normal. She exhibits no distension. Neurological: She is alert and oriented to person, place, and time. Skin: Skin is warm and dry. Psychiatric: She has a normal mood and affect. Her behavior is normal.  
 
 
 
Current Outpatient Prescriptions Medication Sig  clonazePAM (KLONOPIN) 1 mg tablet take 1 tablet by mouth twice a day  ketoconazole (NIZORAL) 2 % topical cream Apply  to affected area daily.  ergocalciferol (ERGOCALCIFEROL) 50,000 unit capsule Take 1 Cap by mouth every seven (7) days.  metoprolol succinate (TOPROL-XL) 50 mg XL tablet Take 1 Tab by mouth daily for 90 days.  amLODIPine (NORVASC) 5 mg tablet TAKE 1 TABLET EVERY DAY  metFORMIN (GLUCOPHAGE) 500 mg tablet TAKE 1 TABLET EVERY DAY WITH BREAKFAST  simvastatin (ZOCOR) 20 mg tablet TAKE 1 TABLET EVERY NIGHT  lisinopril (PRINIVIL, ZESTRIL) 40 mg tablet TAKE 1 TABLET EVERY DAY  LORazepam (ATIVAN) 1 mg tablet Take 1 mg by mouth daily.  docusate sodium (COLACE) 100 mg capsule Take 100 mg by mouth two (2) times a day. No current facility-administered medications for this visit. Past Medical History:  
Diagnosis Date  Anxiety  Constipation  Difficulty swallowing pills  Headache  Hypertension  Muscle pain Past Surgical History:  
Procedure Laterality Date  HX CATARACT REMOVAL Social History Substance Use Topics  Smoking status: Current Every Day Smoker Packs/day: 1.50 Years: 50.00  Smokeless tobacco: Never Used  Alcohol use No  
  
Family History Problem Relation Age of Onset  Dementia Mother  Anxiety Mother  Pulmonary Embolism Father  Hypertension Father Allergies Allergen Reactions  Ciprofloxacin Unknown (comments)  Lidocaine Shortness of Breath  Pcn [Penicillins] Unknown (comments)  Shellfish Derived Hives  Sulfa (Sulfonamide Antibiotics) Unknown (comments) Assessment/Plan Diagnoses and all orders for this visit: 
 
1. Medicare annual wellness visit, subsequent - Reviewed with Pharm D. Refusing all cancer screening and immunization. 2. Anxiety - much better and on lower benzo dose. 3. Essential hypertension - stable. No changes. -     metoprolol succinate (TOPROL-XL) 50 mg XL tablet; Take 1 Tab by mouth daily for 90 days. 4. Mixed hyperlipidemia - stable. Repeat. -     LIPID PANEL 
-     METABOLIC PANEL, COMPREHENSIVE 5. Vitamin D deficiency - repeat. -     VITAMIN D, 25 HYDROXY 
-     ergocalciferol (ERGOCALCIFEROL) 50,000 unit capsule; Take 1 Cap by mouth every seven (7) days. 6. Tobacco abuse - counseled. Not interested in screening.  
-     URINALYSIS W/ RFLX MICROSCOPIC 7. IFG (impaired fasting glucose) - stable. Repeat 
-     HEMOGLOBIN A1C WITH EAG 
 
8. Yeast infection - nystatin did not help. Try azole. -     ketoconazole (NIZORAL) 2 % topical cream; Apply  to affected area daily. Follow-up Disposition: 
Return in about 6 months (around 4/10/2019). Joi De Leon MD 
10/10/2018

## 2018-10-11 LAB
25(OH)D3+25(OH)D2 SERPL-MCNC: 60 NG/ML (ref 30–100)
ALBUMIN SERPL-MCNC: 4.5 G/DL (ref 3.5–4.8)
ALBUMIN/GLOB SERPL: 1.7 {RATIO} (ref 1.2–2.2)
ALP SERPL-CCNC: 43 IU/L (ref 39–117)
ALT SERPL-CCNC: 11 IU/L (ref 0–32)
AST SERPL-CCNC: 16 IU/L (ref 0–40)
BILIRUB SERPL-MCNC: 0.4 MG/DL (ref 0–1.2)
BUN SERPL-MCNC: 7 MG/DL (ref 8–27)
BUN/CREAT SERPL: 9 (ref 12–28)
CALCIUM SERPL-MCNC: 9.9 MG/DL (ref 8.7–10.3)
CHLORIDE SERPL-SCNC: 102 MMOL/L (ref 96–106)
CHOLEST SERPL-MCNC: 127 MG/DL (ref 100–199)
CO2 SERPL-SCNC: 24 MMOL/L (ref 20–29)
CREAT SERPL-MCNC: 0.82 MG/DL (ref 0.57–1)
EST. AVERAGE GLUCOSE BLD GHB EST-MCNC: 114 MG/DL
GLOBULIN SER CALC-MCNC: 2.7 G/DL (ref 1.5–4.5)
GLUCOSE SERPL-MCNC: 97 MG/DL (ref 65–99)
HBA1C MFR BLD: 5.6 % (ref 4.8–5.6)
HDLC SERPL-MCNC: 38 MG/DL
INTERPRETATION, 910389: NORMAL
LDLC SERPL CALC-MCNC: 60 MG/DL (ref 0–99)
POTASSIUM SERPL-SCNC: 3.8 MMOL/L (ref 3.5–5.2)
PROT SERPL-MCNC: 7.2 G/DL (ref 6–8.5)
SODIUM SERPL-SCNC: 143 MMOL/L (ref 134–144)
TRIGL SERPL-MCNC: 147 MG/DL (ref 0–149)
VLDLC SERPL CALC-MCNC: 29 MG/DL (ref 5–40)

## 2018-12-10 DIAGNOSIS — B37.9 YEAST INFECTION: ICD-10-CM

## 2018-12-10 RX ORDER — KETOCONAZOLE 20 MG/G
CREAM TOPICAL
Qty: 15 G | Refills: 1 | Status: SHIPPED | OUTPATIENT
Start: 2018-12-10 | End: 2019-02-21 | Stop reason: SDUPTHER

## 2019-02-21 ENCOUNTER — OFFICE VISIT (OUTPATIENT)
Dept: INTERNAL MEDICINE CLINIC | Age: 74
End: 2019-02-21

## 2019-02-21 VITALS
WEIGHT: 160 LBS | TEMPERATURE: 98.1 F | HEIGHT: 65 IN | BODY MASS INDEX: 26.66 KG/M2 | DIASTOLIC BLOOD PRESSURE: 76 MMHG | RESPIRATION RATE: 18 BRPM | OXYGEN SATURATION: 98 % | HEART RATE: 73 BPM | SYSTOLIC BLOOD PRESSURE: 128 MMHG

## 2019-02-21 DIAGNOSIS — Z72.0 TOBACCO ABUSE: ICD-10-CM

## 2019-02-21 DIAGNOSIS — E78.2 MIXED HYPERLIPIDEMIA: ICD-10-CM

## 2019-02-21 DIAGNOSIS — I10 ESSENTIAL HYPERTENSION: Primary | ICD-10-CM

## 2019-02-21 DIAGNOSIS — B37.9 YEAST INFECTION: ICD-10-CM

## 2019-02-21 DIAGNOSIS — R73.01 IFG (IMPAIRED FASTING GLUCOSE): ICD-10-CM

## 2019-02-21 DIAGNOSIS — E55.9 VITAMIN D DEFICIENCY: ICD-10-CM

## 2019-02-21 DIAGNOSIS — F41.9 ANXIETY: ICD-10-CM

## 2019-02-21 RX ORDER — METOPROLOL SUCCINATE 50 MG/1
50 TABLET, EXTENDED RELEASE ORAL DAILY
Qty: 90 TAB | Refills: 2 | Status: SHIPPED | OUTPATIENT
Start: 2019-02-21 | End: 2019-08-21 | Stop reason: SDUPTHER

## 2019-02-21 RX ORDER — SIMVASTATIN 20 MG/1
TABLET, FILM COATED ORAL
Qty: 90 TAB | Refills: 1 | Status: SHIPPED | OUTPATIENT
Start: 2019-02-21 | End: 2019-08-21 | Stop reason: SDUPTHER

## 2019-02-21 RX ORDER — KETOCONAZOLE 20 MG/G
CREAM TOPICAL
Qty: 15 G | Refills: 1 | Status: SHIPPED | OUTPATIENT
Start: 2019-02-21 | End: 2019-04-19 | Stop reason: SDUPTHER

## 2019-02-21 RX ORDER — FLUCONAZOLE 150 MG/1
150 TABLET ORAL DAILY
Qty: 1 TAB | Refills: 0 | Status: SHIPPED | OUTPATIENT
Start: 2019-02-21 | End: 2019-02-22

## 2019-02-21 RX ORDER — AMLODIPINE BESYLATE 5 MG/1
TABLET ORAL
Qty: 90 TAB | Refills: 1 | Status: SHIPPED | OUTPATIENT
Start: 2019-02-21 | End: 2019-08-21 | Stop reason: SDUPTHER

## 2019-02-21 RX ORDER — LISINOPRIL 40 MG/1
TABLET ORAL
Qty: 90 TAB | Refills: 1 | Status: SHIPPED | OUTPATIENT
Start: 2019-02-21 | End: 2019-08-21 | Stop reason: SDUPTHER

## 2019-02-21 RX ORDER — LORAZEPAM 0.5 MG/1
TABLET ORAL
COMMUNITY
End: 2019-08-21

## 2019-02-21 NOTE — PROGRESS NOTES
Les Garcia is a 75 y.o. female who presents today for Medication Evaluation Ledy Aleah She has a history of  
Patient Active Problem List  
Diagnosis Code  Essential hypertension I10  
 Mixed hyperlipidemia E78.2  Vitamin D deficiency E55.9  Pre-diabetes R73.03  
 Tobacco abuse Z72.0  Family history of abdominal aortic aneurysm (AAA) Z82.49  
 Anxiety F41.9  Advanced care planning/counseling discussion Z71.89 Ledy Bullard Today patient is here for follow-up. IFG: Is been on metformin for some time. Her A1c has been very stable. We discussed possibly stopping metformin. If her A1c remains stable we will stop this. Anxiety: Patient is now seeing Dr. Dianne Su. She is been weaned off her Ativan and has been placed on Klonopin. She is overall lower dose benzos. She notes that she is taking 0.5 ativan once daily and klonopin 1mg BID. Anxiety is stable/improved. Hypertension - on CCB, ACEi and BB. Blood pressure looks great today. Hypertension ROS: taking medications as instructed, no medication side effects noted, no TIA's, no chest pain on exertion, no dyspnea on exertion, no swelling of ankles     reports that she has been smoking. She has a 75.00 pack-year smoking history. she has never used smokeless tobacco.    reports that she does not drink alcohol. BP Readings from Last 2 Encounters:  
02/21/19 128/76  
10/10/18 128/82 Hyperlipidemia Patient currently taking 20 mg of zocor. ROS: taking medications as instructed, no medication side effects noted No new myalgias, no joint pains, no weakness No TIA's, no chest pain on exertion, no dyspnea on exertion, no swelling of ankles. Lab Results Component Value Date/Time  Cholesterol, total 127 10/10/2018 12:55 PM  
 HDL Cholesterol 38 (L) 10/10/2018 12:55 PM  
 LDL, calculated 60 10/10/2018 12:55 PM  
 VLDL, calculated 29 10/10/2018 12:55 PM  
 Triglyceride 147 10/10/2018 12:55 PM  
 
 Tobacco Use: still smoking about 2 ppd. Patient inquires about Chantix. I discussed how Chantix works and side effects. She will wait to see until her anxiety completely stabilizes and then may consider Chantix. ROS Review of Systems Constitutional: Negative for chills, fever, malaise/fatigue and weight loss. HENT: Negative for ear discharge, ear pain, hearing loss, nosebleeds and tinnitus. Eyes: Negative for blurred vision, double vision, photophobia and pain. Respiratory: Positive for shortness of breath (chronic). Negative for cough. Cardiovascular: Negative for chest pain, palpitations and leg swelling. Gastrointestinal: Negative for abdominal pain, constipation, diarrhea, heartburn, nausea and vomiting. Genitourinary: Negative for dysuria, frequency and urgency. Musculoskeletal: Negative for back pain, joint pain, myalgias and neck pain. Skin: Positive for itching. Negative for rash. She continues to have occasional vaginal itching Neurological: Negative. Endo/Heme/Allergies: Does not bruise/bleed easily. Psychiatric/Behavioral: Negative for depression. The patient is not nervous/anxious. Visit Vitals /76 (BP 1 Location: Left arm, BP Patient Position: Sitting) Pulse 73 Temp 98.1 °F (36.7 °C) (Oral) Resp 18 Ht 5' 5\" (1.651 m) Wt 160 lb (72.6 kg) SpO2 98% BMI 26.63 kg/m² Physical Exam  
Constitutional: She is oriented to person, place, and time. She appears well-developed and well-nourished. HENT:  
Head: Normocephalic and atraumatic. Cardiovascular: Normal rate and regular rhythm. No murmur heard. Pulmonary/Chest: Effort normal. No respiratory distress. Neurological: She is alert and oriented to person, place, and time. Skin: Skin is warm and dry. Psychiatric: She has a normal mood and affect. Her behavior is normal.  
 
 
 
Current Outpatient Medications Medication Sig  LORazepam (ATIVAN) 0.5 mg tablet Take  by mouth.  ketoconazole (NIZORAL) 2 % topical cream Use a small amout to affected areas BID.  fluconazole (DIFLUCAN) 150 mg tablet Take 1 Tab by mouth daily for 1 day. FDA advises cautious prescribing of oral fluconazole in pregnancy.  metoprolol succinate (TOPROL-XL) 50 mg XL tablet Take 1 Tab by mouth daily for 90 days.  lisinopril (PRINIVIL, ZESTRIL) 40 mg tablet TAKE 1 TABLET EVERY DAY  simvastatin (ZOCOR) 20 mg tablet TAKE 1 TABLET EVERY NIGHT  amLODIPine (NORVASC) 5 mg tablet TAKE 1 TABLET EVERY DAY  clonazePAM (KLONOPIN) 1 mg tablet take 1 tablet by mouth twice a day  ergocalciferol (ERGOCALCIFEROL) 50,000 unit capsule Take 1 Cap by mouth every seven (7) days.  metFORMIN (GLUCOPHAGE) 500 mg tablet TAKE 1 TABLET EVERY DAY WITH BREAKFAST  docusate sodium (COLACE) 100 mg capsule Take 100 mg by mouth two (2) times a day. No current facility-administered medications for this visit. Past Medical History:  
Diagnosis Date  Anxiety  Constipation  Difficulty swallowing pills  Headache  Hypertension  Muscle pain Past Surgical History:  
Procedure Laterality Date  HX CATARACT REMOVAL Social History Tobacco Use  Smoking status: Current Every Day Smoker Packs/day: 1.50 Years: 50.00 Pack years: 75.00  Smokeless tobacco: Never Used Substance Use Topics  Alcohol use: No  
  
Family History Problem Relation Age of Onset  Dementia Mother  Anxiety Mother  Pulmonary Embolism Father  Hypertension Father Allergies Allergen Reactions  Ciprofloxacin Unknown (comments)  Lidocaine Shortness of Breath  Pcn [Penicillins] Unknown (comments)  Shellfish Derived Hives  Sulfa (Sulfonamide Antibiotics) Unknown (comments) Assessment/Plan Diagnoses and all orders for this visit: 1. Essential hypertension -blood pressure stable.   Refill current medications 
-     HEMOGLOBIN A1C WITH EAG 
 -     METABOLIC PANEL, BASIC 
-     metoprolol succinate (TOPROL-XL) 50 mg XL tablet; Take 1 Tab by mouth daily for 90 days. -     lisinopril (PRINIVIL, ZESTRIL) 40 mg tablet; TAKE 1 TABLET EVERY DAY 
-     amLODIPine (NORVASC) 5 mg tablet; TAKE 1 TABLET EVERY DAY 
 
2. IFG (impaired fasting glucose) -discussed that this is been very well controlled. If her A1c is still well controlled we will stop metformin. 
-     HEMOGLOBIN A1C WITH EAG 
-     METABOLIC PANEL, BASIC 3. Anxiety -has been able to come off of the Ativan and continues clonazepam.  Her anxiety overall is much much better 4. Tobacco abuse -inquires about Chantix. Could consider this once her anxiety is stable 
-     URINALYSIS W/ RFLX MICROSCOPIC 5. Yeast infection -still occasional yeast infections. Continue topical and will give a one-time Diflucan today 
-     ketoconazole (NIZORAL) 2 % topical cream; Use a small amout to affected areas BID. -     fluconazole (DIFLUCAN) 150 mg tablet; Take 1 Tab by mouth daily for 1 day. FDA advises cautious prescribing of oral fluconazole in pregnancy. 6. Vitamin D deficiency 7. Mixed hyperlipidemia -stable no changes needed 
-     simvastatin (ZOCOR) 20 mg tablet; TAKE 1 TABLET EVERY NIGHT Follow-up Disposition: 
Return in about 6 months (around 8/21/2019) for CPE. Luba Medel MD 
2/21/2019

## 2019-02-22 DIAGNOSIS — R31.29 OTHER MICROSCOPIC HEMATURIA: Primary | ICD-10-CM

## 2019-02-22 LAB
APPEARANCE UR: CLEAR
BACTERIA #/AREA URNS HPF: ABNORMAL /[HPF]
BILIRUB UR QL STRIP: NEGATIVE
BUN SERPL-MCNC: 9 MG/DL (ref 8–27)
BUN/CREAT SERPL: 10 (ref 12–28)
CALCIUM SERPL-MCNC: 9.9 MG/DL (ref 8.7–10.3)
CASTS URNS QL MICRO: ABNORMAL /LPF
CHLORIDE SERPL-SCNC: 101 MMOL/L (ref 96–106)
CO2 SERPL-SCNC: 26 MMOL/L (ref 20–29)
COLOR UR: YELLOW
CREAT SERPL-MCNC: 0.91 MG/DL (ref 0.57–1)
EPI CELLS #/AREA URNS HPF: ABNORMAL /HPF
EST. AVERAGE GLUCOSE BLD GHB EST-MCNC: 114 MG/DL
GLUCOSE SERPL-MCNC: 96 MG/DL (ref 65–99)
GLUCOSE UR QL: NEGATIVE
HBA1C MFR BLD: 5.6 % (ref 4.8–5.6)
HGB UR QL STRIP: ABNORMAL
KETONES UR QL STRIP: NEGATIVE
LEUKOCYTE ESTERASE UR QL STRIP: ABNORMAL
MICRO URNS: ABNORMAL
MUCOUS THREADS URNS QL MICRO: PRESENT
NITRITE UR QL STRIP: NEGATIVE
PH UR STRIP: 6.5 [PH] (ref 5–7.5)
POTASSIUM SERPL-SCNC: 4 MMOL/L (ref 3.5–5.2)
PROT UR QL STRIP: NEGATIVE
RBC #/AREA URNS HPF: ABNORMAL /HPF
SODIUM SERPL-SCNC: 141 MMOL/L (ref 134–144)
SP GR UR: 1.01 (ref 1–1.03)
UROBILINOGEN UR STRIP-MCNC: 0.2 MG/DL (ref 0.2–1)
WBC #/AREA URNS HPF: ABNORMAL /HPF

## 2019-04-19 DIAGNOSIS — B37.9 YEAST INFECTION: ICD-10-CM

## 2019-04-19 RX ORDER — KETOCONAZOLE 20 MG/G
CREAM TOPICAL
Qty: 15 G | Refills: 1 | Status: SHIPPED | OUTPATIENT
Start: 2019-04-19 | End: 2019-06-19 | Stop reason: SDUPTHER

## 2019-06-13 DIAGNOSIS — F41.9 ANXIETY: Primary | ICD-10-CM

## 2019-06-13 RX ORDER — CLONAZEPAM 1 MG/1
1 TABLET ORAL 2 TIMES DAILY
Qty: 60 TAB | Refills: 0 | OUTPATIENT
Start: 2019-06-13

## 2019-06-13 NOTE — TELEPHONE ENCOUNTER
Patient called to report that she is having issues with filling a script that has refills on it/ Patient stated that she was in [place] and had her script filled there. Now the pharmacy in [facility] is unable to transfer her script with refills back to [place]. Patient stated that she will be completely out of medication on Saturday. Patient also stated that she will go into major withdraw if not able to fill enough medication until able to see DR. Pascal or Specialist. (both doctors are not in the office this week)  Patient is requesting enough medication be called in to her pharmacy. Patient asked that a nurse please call her back this am.     [phone]    Clonazepam 1 MG tab  - 2 times a day     [address] [phone]

## 2019-08-21 ENCOUNTER — OFFICE VISIT (OUTPATIENT)
Dept: INTERNAL MEDICINE CLINIC | Age: 74
End: 2019-08-21

## 2019-08-21 VITALS
HEIGHT: 65 IN | SYSTOLIC BLOOD PRESSURE: 112 MMHG | RESPIRATION RATE: 18 BRPM | HEART RATE: 62 BPM | BODY MASS INDEX: 25.16 KG/M2 | OXYGEN SATURATION: 98 % | DIASTOLIC BLOOD PRESSURE: 66 MMHG | WEIGHT: 151 LBS | TEMPERATURE: 97.8 F

## 2019-08-21 DIAGNOSIS — E55.9 VITAMIN D DEFICIENCY: ICD-10-CM

## 2019-08-21 DIAGNOSIS — R73.01 IFG (IMPAIRED FASTING GLUCOSE): ICD-10-CM

## 2019-08-21 DIAGNOSIS — B37.9 YEAST INFECTION: ICD-10-CM

## 2019-08-21 DIAGNOSIS — I10 ESSENTIAL HYPERTENSION: Primary | ICD-10-CM

## 2019-08-21 DIAGNOSIS — E78.2 MIXED HYPERLIPIDEMIA: ICD-10-CM

## 2019-08-21 DIAGNOSIS — Z72.0 TOBACCO ABUSE: ICD-10-CM

## 2019-08-21 DIAGNOSIS — F41.9 ANXIETY: ICD-10-CM

## 2019-08-21 RX ORDER — LISINOPRIL 40 MG/1
TABLET ORAL
Qty: 90 TAB | Refills: 1 | Status: SHIPPED | OUTPATIENT
Start: 2019-08-21 | End: 2019-08-22 | Stop reason: SDUPTHER

## 2019-08-21 RX ORDER — KETOCONAZOLE 20 MG/G
CREAM TOPICAL
Qty: 60 G | Refills: 1 | Status: SHIPPED | OUTPATIENT
Start: 2019-08-21 | End: 2020-03-02

## 2019-08-21 RX ORDER — ERGOCALCIFEROL 1.25 MG/1
50000 CAPSULE ORAL
Qty: 12 CAP | Refills: 3 | Status: SHIPPED | OUTPATIENT
Start: 2019-08-21 | End: 2019-08-22 | Stop reason: SDUPTHER

## 2019-08-21 RX ORDER — METOPROLOL SUCCINATE 50 MG/1
50 TABLET, EXTENDED RELEASE ORAL DAILY
Qty: 90 TAB | Refills: 2 | Status: SHIPPED | OUTPATIENT
Start: 2019-08-21 | End: 2019-08-22 | Stop reason: SDUPTHER

## 2019-08-21 RX ORDER — AMLODIPINE BESYLATE 5 MG/1
TABLET ORAL
Qty: 90 TAB | Refills: 1 | Status: SHIPPED | OUTPATIENT
Start: 2019-08-21 | End: 2019-08-22 | Stop reason: SDUPTHER

## 2019-08-21 RX ORDER — SIMVASTATIN 20 MG/1
TABLET, FILM COATED ORAL
Qty: 90 TAB | Refills: 1 | Status: SHIPPED | OUTPATIENT
Start: 2019-08-21 | End: 2019-08-22 | Stop reason: SDUPTHER

## 2019-08-21 NOTE — PATIENT INSTRUCTIONS
Leg and Ankle Edema: Care Instructions  Your Care Instructions  Swelling in the legs, ankles, and feet is called edema. It is common after you sit or stand for a while. Long plane flights or car rides often cause swelling in the legs and feet. You may also have swelling if you have to stand for long periods of time at your job. Problems with the veins in the legs (varicose veins) and changes in hormones can also cause swelling. Sometimes the swelling in the ankles and feet is caused by a more serious problem, such as heart failure, infection, blood clots, or liver or kidney disease. Follow-up care is a key part of your treatment and safety. Be sure to make and go to all appointments, and call your doctor if you are having problems. It's also a good idea to know your test results and keep a list of the medicines you take. How can you care for yourself at home? · If your doctor gave you medicine, take it as prescribed. Call your doctor if you think you are having a problem with your medicine. · Whenever you are resting, raise your legs up. Try to keep the swollen area higher than the level of your heart. · Take breaks from standing or sitting in one position. ? Walk around to increase the blood flow in your lower legs. ? Move your feet and ankles often while you stand, or tighten and relax your leg muscles. · Wear support stockings. Put them on in the morning, before swelling gets worse. · Eat a balanced diet. Lose weight if you need to. · Limit the amount of salt (sodium) in your diet. Salt holds fluid in the body and may increase swelling. When should you call for help? Call 911 anytime you think you may need emergency care. For example, call if:    · You have symptoms of a blood clot in your lung (called a pulmonary embolism). These may include:  ? Sudden chest pain. ? Trouble breathing. ?  Coughing up blood.    Call your doctor now or seek immediate medical care if:    · You have signs of a blood clot, such as:  ? Pain in your calf, back of the knee, thigh, or groin. ? Redness and swelling in your leg or groin.     · You have symptoms of infection, such as:  ? Increased pain, swelling, warmth, or redness. ? Red streaks or pus. ? A fever.    Watch closely for changes in your health, and be sure to contact your doctor if:    · Your swelling is getting worse.     · You have new or worsening pain in your legs.     · You do not get better as expected. Where can you learn more? Go to http://viviane-justin.info/. Enter W039 in the search box to learn more about \"Leg and Ankle Edema: Care Instructions. \"  Current as of: September 23, 2018  Content Version: 12.1  © 9030-4715 [employer]. Care instructions adapted under license by [employer] (which disclaims liability or warranty for this information). If you have questions about a medical condition or this instruction, always ask your healthcare professional. [address] any warranty or liability for your use of this information.

## 2019-08-21 NOTE — PROGRESS NOTES
Nini Romero is a 75 y.o. female who presents today for Medication Evaluation; Hypertension; and Cholesterol Problem  . She has a history of   Patient Active Problem List   Diagnosis Code    Essential hypertension I10    Mixed hyperlipidemia E78.2    Vitamin D deficiency E55.9    Pre-diabetes R73.03    Tobacco abuse Z72.0    Family history of abdominal aortic aneurysm (AAA) Z82.49    Anxiety F41.9    Advanced care planning/counseling discussion Z71.89   . Today patient is here for follow-up. .   Patient still refusing all cancer screening tests and pneumonia vaccines. Impaired fasting glucose: Patient has been taken off her metformin. We will repeat her A1c today. She notes that she has been working on weight loss due to fear of increasing weight off metformin. Patient congratulated on this    Hypertension - stable. Denies any sensations of low blood pressure. We discussed that if she does start experiencing these to let us know we can cut back on her amlodipine  Hypertension ROS: taking medications as instructed, no medication side effects noted, no TIA's, no chest pain on exertion, no dyspnea on exertion, no swelling of ankles     reports that she has been smoking. She has a 75.00 pack-year smoking history. She has never used smokeless tobacco.    reports that she does not drink alcohol. BP Readings from Last 2 Encounters:   08/21/19 112/66   02/21/19 128/76     Hyperlipidemia  Continues to take Zocor. ROS: taking medications as instructed, no medication side effects noted  No new myalgias, no joint pains, no weakness  No TIA's, no chest pain on exertion, no dyspnea on exertion, no swelling of ankles.    Lab Results   Component Value Date/Time    Cholesterol, total 127 10/10/2018 12:55 PM    HDL Cholesterol 38 (L) 10/10/2018 12:55 PM    LDL, calculated 60 10/10/2018 12:55 PM    VLDL, calculated 29 10/10/2018 12:55 PM    Triglyceride 147 10/10/2018 12:55 PM     Anxiety: Patient is now seeing Dr. Yulia Curtis. She is been weaned off her Ativan and has been placed on Klonopin. She is overall lower dose benzos. She notes that she is off of the ativan once daily and klonopin 1mg BID. Anxiety is stable/improved. No panic attack. Tobacco Abuse: continues to smoke. Her yeast infection is improved with PRN ketoconazole    ROS  Review of Systems   Constitutional: Negative for chills, fever and weight loss. Eyes: Negative for blurred vision, double vision, photophobia and pain. Respiratory: Negative for cough and shortness of breath. Cardiovascular: Negative for chest pain, palpitations, orthopnea, claudication and leg swelling. Gastrointestinal: Negative for abdominal pain, heartburn, nausea and vomiting. Genitourinary: Negative for dysuria, frequency, hematuria and urgency. Yeast infection   Neurological: Negative. Endo/Heme/Allergies: Does not bruise/bleed easily. Psychiatric/Behavioral: Negative for depression. The patient is nervous/anxious. Visit Vitals  /66 (BP 1 Location: Left arm, BP Patient Position: Sitting)   Pulse 62   Temp 97.8 °F (36.6 °C) (Oral)   Resp 18   Ht 5' 5\" (1.651 m)   Wt 151 lb (68.5 kg)   SpO2 98%   BMI 25.13 kg/m²       Physical Exam   Constitutional: She is oriented to person, place, and time. She appears well-developed and well-nourished. No distress. HENT:   Head: Normocephalic and atraumatic. Neck: Normal range of motion. Neck supple. No thyromegaly present. Cardiovascular: Normal rate and regular rhythm. No murmur heard. Pulmonary/Chest: Effort normal and breath sounds normal. She has no wheezes. Abdominal: Soft. Bowel sounds are normal. She exhibits no distension. Musculoskeletal: She exhibits no edema. Neurological: She is alert and oriented to person, place, and time. No cranial nerve deficit. Skin: Skin is warm and dry. Psychiatric: She has a normal mood and affect.  Her behavior is normal.   Much more pleasant and obviously less anxious than at previous visits         Current Outpatient Medications   Medication Sig    ketoconazole (NIZORAL) 2 % topical cream APPLY A SMALL AMOUNT TO AFFECTED AREAS TWICE A DAY    metoprolol succinate (TOPROL-XL) 50 mg XL tablet Take 1 Tab by mouth daily for 90 days.  lisinopril (PRINIVIL, ZESTRIL) 40 mg tablet TAKE 1 TABLET EVERY DAY    simvastatin (ZOCOR) 20 mg tablet TAKE 1 TABLET EVERY NIGHT    amLODIPine (NORVASC) 5 mg tablet TAKE 1 TABLET EVERY DAY    clonazePAM (KLONOPIN) 1 mg tablet take 1 tablet by mouth twice a day    ergocalciferol (ERGOCALCIFEROL) 50,000 unit capsule Take 1 Cap by mouth every seven (7) days.  docusate sodium (COLACE) 100 mg capsule Take 100 mg by mouth two (2) times a day.  LORazepam (ATIVAN) 0.5 mg tablet Take  by mouth.  metFORMIN (GLUCOPHAGE) 500 mg tablet TAKE 1 TABLET EVERY DAY WITH BREAKFAST     No current facility-administered medications for this visit. Past Medical History:   Diagnosis Date    Anxiety     Constipation     Difficulty swallowing pills     Headache     Hypertension     Muscle pain       Past Surgical History:   Procedure Laterality Date    HX CATARACT REMOVAL        Social History     Tobacco Use    Smoking status: Current Every Day Smoker     Packs/day: 1.50     Years: 50.00     Pack years: 75.00    Smokeless tobacco: Never Used   Substance Use Topics    Alcohol use: No      Family History   Problem Relation Age of Onset    Dementia Mother     Anxiety Mother     Pulmonary Embolism Father     Hypertension Father         Allergies   Allergen Reactions    Ciprofloxacin Unknown (comments)    Lidocaine Shortness of Breath    Pcn [Penicillins] Unknown (comments)    Shellfish Derived Hives    Sulfa (Sulfonamide Antibiotics) Unknown (comments)        Assessment/Plan  Diagnoses and all orders for this visit:    1. Essential hypertension -blood pressure stable continue current therapy.   Patient starts experiencing hypo-tension she can let us know we can cut back on the amlodipine dose  -     METABOLIC PANEL, COMPREHENSIVE  -     HEMOGLOBIN A1C WITH EAG  -     URINALYSIS W/ RFLX MICROSCOPIC  -     amLODIPine (NORVASC) 5 mg tablet; TAKE 1 TABLET EVERY DAY  -     lisinopril (PRINIVIL, ZESTRIL) 40 mg tablet; TAKE 1 TABLET EVERY DAY  -     metoprolol succinate (TOPROL-XL) 50 mg XL tablet; Take 1 Tab by mouth daily for 181 days. 2. Anxiety  -patient congratulated on having stopped the Ativan. She is still on low-dose benzodiazepine    3. Vitamin D deficiency -repeat vitamin D  -     VITAMIN D, 25 HYDROXY  -     ergocalciferol (ERGOCALCIFEROL) 50,000 unit capsule; Take 1 Cap by mouth every seven (7) days. 4. Tobacco abuse -counseled. Patient has no drive to quit smoking  -     URINALYSIS W/ RFLX MICROSCOPIC    5. Mixed hyperlipidemia -check lipids today  -     LIPID PANEL  -     simvastatin (ZOCOR) 20 mg tablet; TAKE 1 TABLET EVERY NIGHT    6. IFG (impaired fasting glucose) -off metformin has been able to maintain weight loss and lose a little bit more weight. Patient congratulated. -     HEMOGLOBIN A1C WITH EAG    7. Yeast infection 0-well-controlled with as needed ketoconazole. -     ketoconazole (NIZORAL) 2 % topical cream; APPLY A SMALL AMOUNT TO AFFECTED AREAS TWICE A DAY            Vikash Larsen MD  8/21/2019    This note was created with the help of speech recognition software Charlyne Kayser) and may contain some 'sound alike' errors.

## 2019-08-22 DIAGNOSIS — E55.9 VITAMIN D DEFICIENCY: ICD-10-CM

## 2019-08-22 DIAGNOSIS — E78.2 MIXED HYPERLIPIDEMIA: ICD-10-CM

## 2019-08-22 DIAGNOSIS — I10 ESSENTIAL HYPERTENSION: ICD-10-CM

## 2019-08-22 LAB
25(OH)D3+25(OH)D2 SERPL-MCNC: 64.4 NG/ML (ref 30–100)
ALBUMIN SERPL-MCNC: 4.2 G/DL (ref 3.5–4.8)
ALBUMIN/GLOB SERPL: 1.8 {RATIO} (ref 1.2–2.2)
ALP SERPL-CCNC: 46 IU/L (ref 39–117)
ALT SERPL-CCNC: 7 IU/L (ref 0–32)
APPEARANCE UR: CLEAR
AST SERPL-CCNC: 13 IU/L (ref 0–40)
BACTERIA #/AREA URNS HPF: NORMAL /[HPF]
BILIRUB SERPL-MCNC: 0.4 MG/DL (ref 0–1.2)
BILIRUB UR QL STRIP: NEGATIVE
BUN SERPL-MCNC: 7 MG/DL (ref 8–27)
BUN/CREAT SERPL: 8 (ref 12–28)
CALCIUM SERPL-MCNC: 9.8 MG/DL (ref 8.7–10.3)
CASTS URNS QL MICRO: NORMAL /LPF
CHLORIDE SERPL-SCNC: 103 MMOL/L (ref 96–106)
CHOLEST SERPL-MCNC: 120 MG/DL (ref 100–199)
CO2 SERPL-SCNC: 27 MMOL/L (ref 20–29)
COLOR UR: YELLOW
CREAT SERPL-MCNC: 0.9 MG/DL (ref 0.57–1)
EPI CELLS #/AREA URNS HPF: NORMAL /HPF (ref 0–10)
EST. AVERAGE GLUCOSE BLD GHB EST-MCNC: 114 MG/DL
GLOBULIN SER CALC-MCNC: 2.4 G/DL (ref 1.5–4.5)
GLUCOSE SERPL-MCNC: 90 MG/DL (ref 65–99)
GLUCOSE UR QL: NEGATIVE
HBA1C MFR BLD: 5.6 % (ref 4.8–5.6)
HDLC SERPL-MCNC: 38 MG/DL
HGB UR QL STRIP: NEGATIVE
INTERPRETATION, 910389: NORMAL
KETONES UR QL STRIP: NEGATIVE
LDLC SERPL CALC-MCNC: 62 MG/DL (ref 0–99)
LEUKOCYTE ESTERASE UR QL STRIP: ABNORMAL
MICRO URNS: ABNORMAL
MUCOUS THREADS URNS QL MICRO: PRESENT
NITRITE UR QL STRIP: NEGATIVE
PH UR STRIP: 6.5 [PH] (ref 5–7.5)
POTASSIUM SERPL-SCNC: 3.4 MMOL/L (ref 3.5–5.2)
PROT SERPL-MCNC: 6.6 G/DL (ref 6–8.5)
PROT UR QL STRIP: NEGATIVE
RBC #/AREA URNS HPF: NORMAL /HPF (ref 0–2)
SODIUM SERPL-SCNC: 145 MMOL/L (ref 134–144)
SP GR UR: 1.01 (ref 1–1.03)
TRIGL SERPL-MCNC: 101 MG/DL (ref 0–149)
UROBILINOGEN UR STRIP-MCNC: 0.2 MG/DL (ref 0.2–1)
VLDLC SERPL CALC-MCNC: 20 MG/DL (ref 5–40)
WBC #/AREA URNS HPF: NORMAL /HPF (ref 0–5)

## 2019-08-22 RX ORDER — ERGOCALCIFEROL 1.25 MG/1
50000 CAPSULE ORAL
Qty: 12 CAP | Refills: 3 | Status: SHIPPED | OUTPATIENT
Start: 2019-08-22 | End: 2020-10-26 | Stop reason: SDUPTHER

## 2019-08-22 RX ORDER — METOPROLOL SUCCINATE 50 MG/1
50 TABLET, EXTENDED RELEASE ORAL DAILY
Qty: 90 TAB | Refills: 2 | Status: SHIPPED | OUTPATIENT
Start: 2019-08-22 | End: 2020-07-22

## 2019-08-22 RX ORDER — SIMVASTATIN 20 MG/1
TABLET, FILM COATED ORAL
Qty: 90 TAB | Refills: 1 | Status: SHIPPED | OUTPATIENT
Start: 2019-08-22 | End: 2020-01-27

## 2019-08-22 RX ORDER — LISINOPRIL 40 MG/1
TABLET ORAL
Qty: 90 TAB | Refills: 1 | Status: SHIPPED | OUTPATIENT
Start: 2019-08-22 | End: 2020-01-27

## 2019-08-22 RX ORDER — AMLODIPINE BESYLATE 5 MG/1
TABLET ORAL
Qty: 90 TAB | Refills: 1 | Status: SHIPPED | OUTPATIENT
Start: 2019-08-22 | End: 2020-01-27

## 2020-01-27 DIAGNOSIS — E78.2 MIXED HYPERLIPIDEMIA: ICD-10-CM

## 2020-01-27 DIAGNOSIS — I10 ESSENTIAL HYPERTENSION: ICD-10-CM

## 2020-01-27 RX ORDER — AMLODIPINE BESYLATE 5 MG/1
TABLET ORAL
Qty: 90 TAB | Refills: 1 | Status: SHIPPED | OUTPATIENT
Start: 2020-01-27 | End: 2020-07-21

## 2020-01-27 RX ORDER — LISINOPRIL 40 MG/1
TABLET ORAL
Qty: 90 TAB | Refills: 1 | Status: SHIPPED | OUTPATIENT
Start: 2020-01-27 | End: 2020-07-22

## 2020-01-27 RX ORDER — SIMVASTATIN 20 MG/1
TABLET, FILM COATED ORAL
Qty: 90 TAB | Refills: 1 | Status: SHIPPED | OUTPATIENT
Start: 2020-01-27 | End: 2020-07-22

## 2020-03-02 DIAGNOSIS — B37.9 YEAST INFECTION: ICD-10-CM

## 2020-03-02 RX ORDER — KETOCONAZOLE 20 MG/G
CREAM TOPICAL
Qty: 60 G | Refills: 1 | Status: SHIPPED | OUTPATIENT
Start: 2020-03-02 | End: 2020-03-26

## 2020-03-26 DIAGNOSIS — B37.9 YEAST INFECTION: ICD-10-CM

## 2020-03-26 RX ORDER — KETOCONAZOLE 20 MG/G
CREAM TOPICAL
Qty: 60 G | Refills: 1 | Status: SHIPPED | OUTPATIENT
Start: 2020-03-26 | End: 2020-11-02

## 2020-07-21 DIAGNOSIS — I10 ESSENTIAL HYPERTENSION: ICD-10-CM

## 2020-07-21 RX ORDER — AMLODIPINE BESYLATE 5 MG/1
TABLET ORAL
Qty: 90 TAB | Refills: 1 | Status: SHIPPED | OUTPATIENT
Start: 2020-07-21 | End: 2021-02-08

## 2020-07-22 DIAGNOSIS — E78.2 MIXED HYPERLIPIDEMIA: ICD-10-CM

## 2020-07-22 DIAGNOSIS — I10 ESSENTIAL HYPERTENSION: ICD-10-CM

## 2020-07-22 RX ORDER — SIMVASTATIN 20 MG/1
TABLET, FILM COATED ORAL
Qty: 90 TAB | Refills: 1 | Status: SHIPPED | OUTPATIENT
Start: 2020-07-22 | End: 2021-02-08

## 2020-07-22 RX ORDER — LISINOPRIL 40 MG/1
TABLET ORAL
Qty: 90 TAB | Refills: 1 | Status: SHIPPED | OUTPATIENT
Start: 2020-07-22 | End: 2021-02-08

## 2020-07-22 RX ORDER — METOPROLOL SUCCINATE 50 MG/1
TABLET, EXTENDED RELEASE ORAL
Qty: 90 TAB | Refills: 2 | Status: SHIPPED | OUTPATIENT
Start: 2020-07-22 | End: 2021-04-06

## 2020-10-16 ENCOUNTER — TELEPHONE (OUTPATIENT)
Dept: INTERNAL MEDICINE CLINIC | Age: 75
End: 2020-10-16

## 2020-10-16 NOTE — TELEPHONE ENCOUNTER
Spoke with patient and advised her that Dr. Ludmila Rice refused her refill because she has not been seen in over a year. Provided appt 10/26/20 at 11:30am for routine visit. Pt understood and was thankful for the call.

## 2020-10-16 NOTE — TELEPHONE ENCOUNTER
----- Message from April VALE Georges sent at 10/16/2020 10:56 AM EDT -----  Regarding: Dr. Eliana Young telephone  Reason for call: Requested a call back   Callback required yes/no and why: Yes   Best contact number(s): [phone]  Details to clarify the request: Pt stated that her Vitamin D2 1250 MCG  cap unit (50K unit) was denied by Dr. Mylinda Cogan and she would tee to know if she needs to still take it. Pt would like to know why the medication was denied.

## 2020-10-26 ENCOUNTER — OFFICE VISIT (OUTPATIENT)
Dept: INTERNAL MEDICINE CLINIC | Age: 75
End: 2020-10-26
Payer: MEDICARE

## 2020-10-26 VITALS
WEIGHT: 148.4 LBS | OXYGEN SATURATION: 97 % | HEART RATE: 56 BPM | RESPIRATION RATE: 14 BRPM | TEMPERATURE: 98.2 F | HEIGHT: 65 IN | DIASTOLIC BLOOD PRESSURE: 72 MMHG | BODY MASS INDEX: 24.72 KG/M2 | SYSTOLIC BLOOD PRESSURE: 123 MMHG

## 2020-10-26 DIAGNOSIS — F41.9 ANXIETY: ICD-10-CM

## 2020-10-26 DIAGNOSIS — R73.01 IFG (IMPAIRED FASTING GLUCOSE): ICD-10-CM

## 2020-10-26 DIAGNOSIS — I10 ESSENTIAL HYPERTENSION: Primary | ICD-10-CM

## 2020-10-26 DIAGNOSIS — E78.2 MIXED HYPERLIPIDEMIA: ICD-10-CM

## 2020-10-26 DIAGNOSIS — Z72.0 TOBACCO ABUSE: ICD-10-CM

## 2020-10-26 DIAGNOSIS — E55.9 VITAMIN D DEFICIENCY: ICD-10-CM

## 2020-10-26 DIAGNOSIS — M54.50 ACUTE BILATERAL LOW BACK PAIN WITHOUT SCIATICA: ICD-10-CM

## 2020-10-26 LAB
ALBUMIN SERPL-MCNC: 3.8 G/DL (ref 3.5–5)
ALBUMIN/GLOB SERPL: 1.2 {RATIO} (ref 1.1–2.2)
ALP SERPL-CCNC: 49 U/L (ref 45–117)
ALT SERPL-CCNC: 15 U/L (ref 12–78)
ANION GAP SERPL CALC-SCNC: 8 MMOL/L (ref 5–15)
AST SERPL-CCNC: 13 U/L (ref 15–37)
BILIRUB SERPL-MCNC: 0.4 MG/DL (ref 0.2–1)
BUN SERPL-MCNC: 9 MG/DL (ref 6–20)
BUN/CREAT SERPL: 11 (ref 12–20)
CALCIUM SERPL-MCNC: 9.4 MG/DL (ref 8.5–10.1)
CHLORIDE SERPL-SCNC: 108 MMOL/L (ref 97–108)
CHOLEST SERPL-MCNC: 122 MG/DL
CO2 SERPL-SCNC: 27 MMOL/L (ref 21–32)
CREAT SERPL-MCNC: 0.79 MG/DL (ref 0.55–1.02)
EST. AVERAGE GLUCOSE BLD GHB EST-MCNC: 114 MG/DL
GLOBULIN SER CALC-MCNC: 3.1 G/DL (ref 2–4)
GLUCOSE SERPL-MCNC: 100 MG/DL (ref 65–100)
HBA1C MFR BLD: 5.6 % (ref 4–5.6)
HDLC SERPL-MCNC: 47 MG/DL
HDLC SERPL: 2.6 {RATIO} (ref 0–5)
LDLC SERPL CALC-MCNC: 55.6 MG/DL (ref 0–100)
LIPID PROFILE,FLP: NORMAL
POTASSIUM SERPL-SCNC: 3.4 MMOL/L (ref 3.5–5.1)
PROT SERPL-MCNC: 6.9 G/DL (ref 6.4–8.2)
SODIUM SERPL-SCNC: 143 MMOL/L (ref 136–145)
TRIGL SERPL-MCNC: 97 MG/DL (ref ?–150)
VLDLC SERPL CALC-MCNC: 19.4 MG/DL

## 2020-10-26 PROCEDURE — 99214 OFFICE O/P EST MOD 30 MIN: CPT | Performed by: INTERNAL MEDICINE

## 2020-10-26 RX ORDER — ERGOCALCIFEROL 1.25 MG/1
50000 CAPSULE ORAL
Qty: 12 CAP | Refills: 3 | Status: SHIPPED | OUTPATIENT
Start: 2020-10-26 | End: 2022-01-31

## 2020-10-26 NOTE — PROGRESS NOTES
Elvis Cui is a 67 y.o. female who presents today for Medication Refill and Follow-up  . She has a history of   Patient Active Problem List   Diagnosis Code    Essential hypertension I10    Mixed hyperlipidemia E78.2    Vitamin D deficiency E55.9    Pre-diabetes R73.03    Tobacco abuse Z72.0    Family history of abdominal aortic aneurysm (AAA) Z82.49    Anxiety F41.9    Advanced care planning/counseling discussion Z71.89   . Today patient f/u.   she does not have other concerns. Continues to follow with psychiatrist for her anxiety. Has been able to titrate her dose weight down. Lower back pain has been a bit worse. No radiation into her legs. No focal weakness. This is a chronic issue. We discussed seeing physical therapist.    Hypertension - stable. Hypertension ROS: taking medications as instructed, no medication side effects noted, no TIA's, no chest pain on exertion, no dyspnea on exertion, no swelling of ankles     reports that she has been smoking. She has a 75.00 pack-year smoking history. She has never used smokeless tobacco.    reports no history of alcohol use. BP Readings from Last 2 Encounters:   10/26/20 123/72   08/21/19 112/66     Hyperlipidemia  ROS: taking medications as instructed, no medication side effects noted  No new myalgias, no joint pains, no weakness  No TIA's, no chest pain on exertion, no dyspnea on exertion, no swelling of ankles. Lab Results   Component Value Date/Time    Cholesterol, total 120 08/21/2019 12:37 PM    HDL Cholesterol 38 (L) 08/21/2019 12:37 PM    LDL, calculated 62 08/21/2019 12:37 PM    VLDL, calculated 20 08/21/2019 12:37 PM    Triglyceride 101 08/21/2019 12:37 PM     IFG: Currently off Metformin. ROS  Review of Systems   Constitutional: Negative for chills, fever and weight loss. HENT: Negative for congestion and sore throat. Eyes: Negative for blurred vision, double vision and photophobia.    Respiratory: Negative for cough, hemoptysis, sputum production and shortness of breath. Cardiovascular: Negative for chest pain, palpitations and leg swelling. Gastrointestinal: Negative for abdominal pain, constipation, diarrhea, heartburn, nausea and vomiting. Genitourinary: Negative for dysuria, frequency and urgency. Musculoskeletal: Positive for back pain. Negative for joint pain and myalgias. Skin: Negative for rash. Neurological: Negative. Negative for headaches. Endo/Heme/Allergies: Does not bruise/bleed easily. Psychiatric/Behavioral: Negative for memory loss and suicidal ideas. The patient is nervous/anxious. Visit Vitals  /72 (BP 1 Location: Left arm, BP Patient Position: Sitting)   Pulse (!) 56   Temp 98.2 °F (36.8 °C) (Oral)   Resp 14   Ht 5' 5\" (1.651 m)   Wt 148 lb 6.4 oz (67.3 kg)   SpO2 97%   BMI 24.70 kg/m²       Physical Exam  Constitutional:       General: She is not in acute distress. Appearance: She is well-developed. HENT:      Head: Normocephalic and atraumatic. Neck:      Musculoskeletal: Normal range of motion and neck supple. Thyroid: No thyromegaly. Cardiovascular:      Rate and Rhythm: Normal rate and regular rhythm. Heart sounds: No murmur. Pulmonary:      Effort: Pulmonary effort is normal.      Breath sounds: Normal breath sounds. No wheezing. Abdominal:      General: Bowel sounds are normal. There is no distension. Palpations: Abdomen is soft. Musculoskeletal:        Arms:       Comments: Normal strength testing to bilateral lower extremities. Normal deep tendon reflexes. No spinal point tenderness over lumbar spine   Skin:     General: Skin is warm and dry. Neurological:      Mental Status: She is alert and oriented to person, place, and time. Cranial Nerves: No cranial nerve deficit.    Psychiatric:         Behavior: Behavior normal.           Current Outpatient Medications   Medication Sig    simvastatin (ZOCOR) 20 mg tablet TAKE 1 TABLET EVERY NIGHT    metoprolol succinate (TOPROL-XL) 50 mg XL tablet TAKE 1 TABLET EVERY DAY    lisinopriL (PRINIVIL, ZESTRIL) 40 mg tablet TAKE 1 TABLET EVERY DAY    amLODIPine (NORVASC) 5 mg tablet TAKE 1 TABLET EVERY DAY    ketoconazole (NIZORAL) 2 % topical cream APPLY SMALL AMOUNT EXTERNALLY TO THE AFFECTED AREA TWICE DAILY    ergocalciferol (ERGOCALCIFEROL) 50,000 unit capsule Take 1 Cap by mouth every seven (7) days.  clonazePAM (KLONOPIN) 1 mg tablet take 1 tablet by mouth twice a day    docusate sodium (COLACE) 100 mg capsule Take 100 mg by mouth two (2) times a day. No current facility-administered medications for this visit. Past Medical History:   Diagnosis Date    Anxiety     Constipation     Difficulty swallowing pills     Headache     Hypertension     Muscle pain       Past Surgical History:   Procedure Laterality Date    HX CATARACT REMOVAL        Social History     Tobacco Use    Smoking status: Current Every Day Smoker     Packs/day: 1.50     Years: 50.00     Pack years: 75.00    Smokeless tobacco: Never Used   Substance Use Topics    Alcohol use: No      Family History   Problem Relation Age of Onset    Dementia Mother     Anxiety Mother     Pulmonary Embolism Father     Hypertension Father         Allergies   Allergen Reactions    Ciprofloxacin Unknown (comments)    Lidocaine Shortness of Breath    Pcn [Penicillins] Unknown (comments)    Shellfish Derived Hives    Sulfa (Sulfonamide Antibiotics) Unknown (comments)        Assessment/Plan  Diagnoses and all orders for this visit:    1. Essential hypertension-stable continue current therapy   -     METABOLIC PANEL, COMPREHENSIVE; Future    2. Vitamin D deficiency-repeat vitamin D levels  -     VITAMIN D, 25 HYDROXY; Future  -     ergocalciferol (ERGOCALCIFEROL) 1,250 mcg (50,000 unit) capsule; Take 1 Cap by mouth every seven (7) days. 3. Mixed hyperlipidemia- repeat lipids  -     LIPID PANEL; Future    4. IFG (impaired fasting glucose)-off Metformin  -     HEMOGLOBIN A1C WITH EAG; Future    5. Anxiety -currently continuing to wean benzodiazepine dose. 6. Tobacco abuse- counseled    7. Acute bilateral low back pain without sciatica-likely musculoskeletal in nature. Suggest seeing physical therapist.P[facility]OT referral   -     179-00 Patricio Baron MD  10/26/2020    This note was created with the help of speech recognition software Jonathan Conroy) and may contain some 'sound alike' errors.

## 2020-10-27 LAB — 25(OH)D3 SERPL-MCNC: 119.2 NG/ML (ref 30–100)

## 2020-11-02 DIAGNOSIS — B37.9 YEAST INFECTION: ICD-10-CM

## 2020-11-02 RX ORDER — KETOCONAZOLE 20 MG/G
CREAM TOPICAL
Qty: 60 G | Refills: 1 | Status: SHIPPED | OUTPATIENT
Start: 2020-11-02 | End: 2021-04-20

## 2020-11-13 ENCOUNTER — TELEPHONE (OUTPATIENT)
Dept: INTERNAL MEDICINE CLINIC | Age: 75
End: 2020-11-13

## 2020-11-13 NOTE — TELEPHONE ENCOUNTER
----- Message from [facility] sent at 11/13/2020  7:37 AM EST -----  Regarding: Dr Chi Villasenor first and last name: Pt  Reason for call: Derm Ref  Callback required yes/no and why: Yes, once ref has been written. Best contact number(s): [phone]  Details to clarify the request: Pt would like a ref to derm.

## 2020-11-13 NOTE — TELEPHONE ENCOUNTER
Pt called requesting a recommendation for a dermotologist.  Provided contact information for Dr. Ana Laura Jiang. Pt does not want to do PT due to pandemic.

## 2021-01-14 ENCOUNTER — TELEPHONE (OUTPATIENT)
Dept: INTERNAL MEDICINE CLINIC | Age: 76
End: 2021-01-14

## 2021-01-14 NOTE — TELEPHONE ENCOUNTER
Patient called requesting blanketed referrals for year for physical therapy. Please call patient to advise.         Coverage information:     Subscriber: [id number] YSABEL JOHNSON     Rel to sub: 01 - Self     Member ID: [id number]     Payor: [facility] MEDICARE     Benefit plan: [facility] Ph: [phone]     Group number: [id number]     Member effective dates: from 01/01/20

## 2021-01-26 NOTE — TELEPHONE ENCOUNTER
Referral obtained and faxed to [facility] the [facility], [facility] location.   Auth # [id number]  00 visits  1/01/2021 - 1/01/2022

## 2021-02-08 DIAGNOSIS — I10 ESSENTIAL HYPERTENSION: ICD-10-CM

## 2021-02-08 DIAGNOSIS — E78.2 MIXED HYPERLIPIDEMIA: ICD-10-CM

## 2021-02-08 RX ORDER — AMLODIPINE BESYLATE 5 MG/1
TABLET ORAL
Qty: 90 TAB | Refills: 1 | Status: SHIPPED | OUTPATIENT
Start: 2021-02-08 | End: 2021-07-22

## 2021-02-08 RX ORDER — SIMVASTATIN 20 MG/1
TABLET, FILM COATED ORAL
Qty: 90 TAB | Refills: 1 | Status: SHIPPED | OUTPATIENT
Start: 2021-02-08 | End: 2021-07-22

## 2021-02-08 RX ORDER — LISINOPRIL 40 MG/1
TABLET ORAL
Qty: 90 TAB | Refills: 1 | Status: SHIPPED | OUTPATIENT
Start: 2021-02-08 | End: 2021-07-22

## 2021-02-09 ENCOUNTER — TELEPHONE (OUTPATIENT)
Dept: INTERNAL MEDICINE CLINIC | Age: 76
End: 2021-02-09

## 2021-02-09 NOTE — TELEPHONE ENCOUNTER
----- Message from Gwendolyn Remy sent at 2/9/2021 11:24 AM EST -----  Regarding: Dr. Srini Espitia: [phone]  General Message/Vendor Calls    Caller's first and last name: Pt.      Reason for call: Covid question, inform of pt not going to physical therapy. Callback required yes/no and why: Yes, needs confirmation. Best contact number(s): [phone]      Details to clarify the request: Pt has many allergies, is unsure if she should get vaccine, and if so should she get through [facility] or can she go elsewhere. Pt has also decided for now, not to go to physical therapy, was told they could hold her place until she is ready, would like to know if there is a time frame for her to have to go back by.        Gwendolyn Remy

## 2021-02-09 NOTE — TELEPHONE ENCOUNTER
Pt advised her PT authorization is good for 99 visits this calender year. Advised Dr Rohan Calloway recommends the Covid vaccine. Pt registered with the [facility] last week.

## 2021-04-05 ENCOUNTER — TELEPHONE (OUTPATIENT)
Dept: INTERNAL MEDICINE CLINIC | Age: 76
End: 2021-04-05

## 2021-04-05 DIAGNOSIS — M54.50 ACUTE BILATERAL LOW BACK PAIN WITHOUT SCIATICA: Primary | ICD-10-CM

## 2021-04-05 NOTE — TELEPHONE ENCOUNTER
Spoke with patient and advised her I would fax over a new physical therapy order to the [facility] on [address]. Advised patient if she does not hear from them in the next couple of days to call their office to schedule an appointment. Pt understood and was thankful for the call. Pt also stated that the dermatologist recommended by Dr. Philip Villasenor was not participating with her insurance but Dr. Yamilex Menjivar was so she will contact her office for an appointment.

## 2021-04-05 NOTE — TELEPHONE ENCOUNTER
Patient needs a new prescription for physical therapy, she has not gone before due to covid.   Please call her [phone]

## 2021-04-06 DIAGNOSIS — I10 ESSENTIAL HYPERTENSION: ICD-10-CM

## 2021-04-06 RX ORDER — METOPROLOL SUCCINATE 50 MG/1
TABLET, EXTENDED RELEASE ORAL
Qty: 90 TAB | Refills: 2 | Status: SHIPPED | OUTPATIENT
Start: 2021-04-06 | End: 2022-01-04

## 2021-04-19 NOTE — TELEPHONE ENCOUNTER
----- Message from Aisha Adolph sent at 6/13/2019  9:43 AM EDT -----  Regarding: /Telephone  Pt would like to inform Cydney that she no longer needs her to call in the prescription they discussed because her psychiatrist office is going to take care of it and would not like a call back. Best contact number is [phone]. Note written and pts mom notified

## 2021-04-20 DIAGNOSIS — B37.9 YEAST INFECTION: ICD-10-CM

## 2021-04-20 RX ORDER — KETOCONAZOLE 20 MG/G
CREAM TOPICAL
Qty: 60 G | Refills: 1 | Status: SHIPPED | OUTPATIENT
Start: 2021-04-20 | End: 2022-05-12 | Stop reason: ALTCHOICE

## 2021-07-22 DIAGNOSIS — I10 ESSENTIAL HYPERTENSION: ICD-10-CM

## 2021-07-22 DIAGNOSIS — E78.2 MIXED HYPERLIPIDEMIA: ICD-10-CM

## 2021-07-22 RX ORDER — LISINOPRIL 40 MG/1
TABLET ORAL
Qty: 90 TABLET | Refills: 1 | Status: SHIPPED | OUTPATIENT
Start: 2021-07-22 | End: 2022-01-31

## 2021-07-22 RX ORDER — AMLODIPINE BESYLATE 5 MG/1
TABLET ORAL
Qty: 90 TABLET | Refills: 1 | Status: SHIPPED | OUTPATIENT
Start: 2021-07-22 | End: 2022-01-31

## 2021-07-22 RX ORDER — SIMVASTATIN 20 MG/1
TABLET, FILM COATED ORAL
Qty: 90 TABLET | Refills: 1 | Status: SHIPPED | OUTPATIENT
Start: 2021-07-22 | End: 2022-01-31

## 2022-01-04 DIAGNOSIS — I10 ESSENTIAL HYPERTENSION: ICD-10-CM

## 2022-01-04 RX ORDER — METOPROLOL SUCCINATE 50 MG/1
TABLET, EXTENDED RELEASE ORAL
Qty: 90 TABLET | Refills: 2 | Status: SHIPPED | OUTPATIENT
Start: 2022-01-04 | End: 2022-07-11

## 2022-04-04 DIAGNOSIS — E78.2 MIXED HYPERLIPIDEMIA: ICD-10-CM

## 2022-04-04 DIAGNOSIS — I10 ESSENTIAL HYPERTENSION: ICD-10-CM

## 2022-04-04 RX ORDER — SIMVASTATIN 20 MG/1
TABLET, FILM COATED ORAL
Qty: 90 TABLET | Refills: 0 | Status: SHIPPED | OUTPATIENT
Start: 2022-04-04 | End: 2022-05-25

## 2022-04-04 RX ORDER — AMLODIPINE BESYLATE 5 MG/1
TABLET ORAL
Qty: 90 TABLET | Refills: 0 | Status: SHIPPED | OUTPATIENT
Start: 2022-04-04 | End: 2022-05-25

## 2022-04-04 RX ORDER — LISINOPRIL 40 MG/1
TABLET ORAL
Qty: 90 TABLET | Refills: 0 | Status: SHIPPED | OUTPATIENT
Start: 2022-04-04 | End: 2022-05-25

## 2022-05-12 ENCOUNTER — OFFICE VISIT (OUTPATIENT)
Dept: INTERNAL MEDICINE CLINIC | Age: 77
End: 2022-05-12
Payer: MEDICARE

## 2022-05-12 VITALS
BODY MASS INDEX: 24.09 KG/M2 | RESPIRATION RATE: 16 BRPM | HEIGHT: 65 IN | DIASTOLIC BLOOD PRESSURE: 80 MMHG | SYSTOLIC BLOOD PRESSURE: 138 MMHG | WEIGHT: 144.6 LBS | TEMPERATURE: 97.9 F | OXYGEN SATURATION: 94 % | HEART RATE: 67 BPM

## 2022-05-12 DIAGNOSIS — B35.6 TINEA CRURIS: ICD-10-CM

## 2022-05-12 DIAGNOSIS — M54.50 LOW BACK PAIN, UNSPECIFIED BACK PAIN LATERALITY, UNSPECIFIED CHRONICITY, UNSPECIFIED WHETHER SCIATICA PRESENT: ICD-10-CM

## 2022-05-12 DIAGNOSIS — E78.2 MIXED HYPERLIPIDEMIA: ICD-10-CM

## 2022-05-12 DIAGNOSIS — F41.9 ANXIETY: ICD-10-CM

## 2022-05-12 DIAGNOSIS — I10 ESSENTIAL HYPERTENSION: Primary | ICD-10-CM

## 2022-05-12 DIAGNOSIS — R73.01 IFG (IMPAIRED FASTING GLUCOSE): ICD-10-CM

## 2022-05-12 PROCEDURE — G8420 CALC BMI NORM PARAMETERS: HCPCS | Performed by: INTERNAL MEDICINE

## 2022-05-12 PROCEDURE — G8536 NO DOC ELDER MAL SCRN: HCPCS | Performed by: INTERNAL MEDICINE

## 2022-05-12 PROCEDURE — G8510 SCR DEP NEG, NO PLAN REQD: HCPCS | Performed by: INTERNAL MEDICINE

## 2022-05-12 PROCEDURE — G8427 DOCREV CUR MEDS BY ELIG CLIN: HCPCS | Performed by: INTERNAL MEDICINE

## 2022-05-12 PROCEDURE — G8752 SYS BP LESS 140: HCPCS | Performed by: INTERNAL MEDICINE

## 2022-05-12 PROCEDURE — G8400 PT W/DXA NO RESULTS DOC: HCPCS | Performed by: INTERNAL MEDICINE

## 2022-05-12 PROCEDURE — 1090F PRES/ABSN URINE INCON ASSESS: CPT | Performed by: INTERNAL MEDICINE

## 2022-05-12 PROCEDURE — G8754 DIAS BP LESS 90: HCPCS | Performed by: INTERNAL MEDICINE

## 2022-05-12 PROCEDURE — 1101F PT FALLS ASSESS-DOCD LE1/YR: CPT | Performed by: INTERNAL MEDICINE

## 2022-05-12 PROCEDURE — 99214 OFFICE O/P EST MOD 30 MIN: CPT | Performed by: INTERNAL MEDICINE

## 2022-05-12 RX ORDER — CLOTRIMAZOLE AND BETAMETHASONE DIPROPIONATE 10; .64 MG/G; MG/G
CREAM TOPICAL 2 TIMES DAILY
Qty: 15 G | Refills: 0 | Status: SHIPPED | OUTPATIENT
Start: 2022-05-12 | End: 2022-05-16

## 2022-05-12 RX ORDER — TERBINAFINE HYDROCHLORIDE 250 MG/1
250 TABLET ORAL DAILY
Qty: 21 TABLET | Refills: 0 | Status: SHIPPED | OUTPATIENT
Start: 2022-05-12 | End: 2022-06-02

## 2022-05-12 NOTE — PROGRESS NOTES
Naomi Piña  Identified pt with two pt identifiers(name and ). Chief Complaint   Patient presents with    Rash     RM18// pt presenting today with a rash on stomach, groin, legs       1. Have you been to the ER, urgent care clinic since your last visit? Hospitalized since your last visit? NO    2. Have you seen or consulted any other health care providers outside of the [address] [place] [address] since your last visit? Include any pap smears or colon screening. NO      Provider notified of reason for visit, vitals and flowsheets obtained on patients.      Patient received paperwork for advance directive during previous visit but has not completed at this time     Reviewed record In preparation for visit, huddled with provider and have obtained necessary documentation      Health Maintenance Due   Topic    Hepatitis C Screening     COVID-19 Vaccine (1)    Pneumococcal 65+ years (1 - PCV)    Shingrix Vaccine Age 49> (1 of 2)    Bone Densitometry (Dexa) Screening     Medicare Yearly Exam     Depression Screen     Lipid Screen        Wt Readings from Last 3 Encounters:   22 144 lb 9.6 oz (65.6 kg)   10/26/20 148 lb 6.4 oz (67.3 kg)   19 151 lb (68.5 kg)     Temp Readings from Last 3 Encounters:   22 97.9 °F (36.6 °C) (Oral)   10/26/20 98.2 °F (36.8 °C) (Oral)   19 97.8 °F (36.6 °C) (Oral)     BP Readings from Last 3 Encounters:   22 138/80   10/26/20 123/72   19 112/66     Pulse Readings from Last 3 Encounters:   22 67   10/26/20 (!) 56   19 62     Vitals:    22 1302   BP: 138/80   Pulse: 67   Resp: 16   Temp: 97.9 °F (36.6 °C)   TempSrc: Oral   SpO2: 94%   Weight: 144 lb 9.6 oz (65.6 kg)   Height: 5' 5\" (1.651 m)   PainSc:   5         Learning Assessment:  :     Learning Assessment 2017   PRIMARY LEARNER Patient   PRIMARY LANGUAGE ENGLISH   LEARNER PREFERENCE PRIMARY LISTENING     READING     DEMONSTRATION   ANSWERED BY Patient   RELATIONSHIP SELF       Depression Screening:  :     3 most recent PHQ Screens 5/12/2022   Little interest or pleasure in doing things Not at all   Feeling down, depressed, irritable, or hopeless Not at all   Total Score PHQ 2 0   Trouble falling or staying asleep, or sleeping too much -   Feeling tired or having little energy -   Poor appetite, weight loss, or overeating -   Feeling bad about yourself - or that you are a failure or have let yourself or your family down -   Trouble concentrating on things such as school, work, reading, or watching TV -   Moving or speaking so slowly that other people could have noticed; or the opposite being so fidgety that others notice -   Thoughts of being better off dead, or hurting yourself in some way -   PHQ 9 Score -   How difficult have these problems made it for you to do your work, take care of your home and get along with others -       Fall Risk Assessment:  :     Fall Risk Assessment, last 12 mths 5/12/2022   Able to walk? Yes   Fall in past 12 months? 0   Do you feel unsteady? 0   Are you worried about falling 0   Number of falls in past 12 months -   Fall with injury? -       Abuse Screening:  :     Abuse Screening Questionnaire 2/21/2019 10/10/2018 6/2/2017   Do you ever feel afraid of your partner? N N N   Are you in a relationship with someone who physically or mentally threatens you? N N N   Is it safe for you to go home?  Y Y Y       ADL Screening:  :     ADL Assessment 10/10/2018   Feeding yourself No Help Needed   Getting from bed to chair No Help Needed   Getting dressed No Help Needed   Bathing or showering No Help Needed   Walk across the room (includes cane/walker) No Help Needed   Using the telphone No Help Needed   Taking your medications No Help Needed   Preparing meals No Help Needed   Managing money (expenses/bills) No Help Needed   Moderately strenuous housework (laundry) No Help Needed   Shopping for personal items (toiletries/medicines) No Help Needed   Shopping for groceries No Help Needed   Driving No Help Needed   Climbing a flight of stairs No Help Needed   Getting to places beyond walking distances No Help Needed         Medication reconciliation up to date and corrected with patient at this time.

## 2022-05-12 NOTE — PROGRESS NOTES
Yuriy Aranda is a 67 y.o. female who presents today for Rash (RM18// pt presenting today with a rash on stomach, groin, legs)  . She has a history of   Patient Active Problem List   Diagnosis Code    Essential hypertension I10    Mixed hyperlipidemia E78.2    Vitamin D deficiency E55.9    Pre-diabetes R73.03    Tobacco abuse Z72.0    Family history of abdominal aortic aneurysm (AAA) Z82.49    Anxiety F41.9    Advanced care planning/counseling discussion Z71.89   . Today patient is here for follow-up. Rash: Patient has been experiencing a rash mainly located to her groin. This has recently gotten much worse. Has been using ketoconazole for some time. This seems to be not helping much. Recently this rash is gotten much worse. It does burn to the touch. She has not had her sugars checked in some time. Unfortunately she did have a dermatologic appointment but after arriving she was told that they would not look at her groin. There is no vaginal involvement of the rash. She did wait several months for this appointment. Still having back problems, but manageable. They have been able to wean her down to 45 clonazepam's monthly. This is way down from where she started out. Hypertension- Borderline. No changes have been made to medications  Hypertension ROS: taking medications as instructed, no medication side effects noted, no TIA's, no chest pain on exertion, no dyspnea on exertion, no swelling of ankles     reports that she has been smoking. She has a 75.00 pack-year smoking history. She has never used smokeless tobacco.    reports no history of alcohol use. BP Readings from Last 2 Encounters:   05/12/22 138/80   10/26/20 123/72       Hyperlipidemia-  Due for repeat lipid testing. ROS: taking medications as instructed, no medication side effects noted  No new myalgias, no joint pains, no weakness  No TIA's, no chest pain on exertion, no dyspnea on exertion, no swelling of ankles. Lab Results   Component Value Date/Time    Cholesterol, total 122 10/26/2020 12:41 PM    HDL Cholesterol 47 10/26/2020 12:41 PM    LDL, calculated 55.6 10/26/2020 12:41 PM    VLDL, calculated 19.4 10/26/2020 12:41 PM    Triglyceride 97 10/26/2020 12:41 PM    CHOL/HDL Ratio 2.6 10/26/2020 12:41 PM         ROS  Review of Systems   Constitutional: Negative for chills, fever and weight loss. HENT: Negative for congestion and sore throat. Eyes: Negative for blurred vision, double vision and photophobia. Respiratory: Negative for cough and shortness of breath. Cardiovascular: Negative for chest pain, palpitations and leg swelling. Gastrointestinal: Negative for abdominal pain, constipation, diarrhea, heartburn, nausea and vomiting. Genitourinary: Negative for dysuria, frequency and urgency. Musculoskeletal: Negative for joint pain and myalgias. Skin: Positive for rash. Neurological: Negative. Negative for headaches. Endo/Heme/Allergies: Does not bruise/bleed easily. Psychiatric/Behavioral: Negative for memory loss and suicidal ideas. The patient is nervous/anxious. Visit Vitals  /80 (BP 1 Location: Left upper arm, BP Patient Position: Sitting, BP Cuff Size: Adult)   Pulse 67   Temp 97.9 °F (36.6 °C) (Oral)   Resp 16   Ht 5' 5\" (1.651 m)   Wt 144 lb 9.6 oz (65.6 kg)   SpO2 94%   BMI 24.06 kg/m²       Physical Exam  Constitutional:       Appearance: She is well-developed. HENT:      Head: Normocephalic and atraumatic. Cardiovascular:      Rate and Rhythm: Normal rate and regular rhythm. Heart sounds: No murmur heard. Pulmonary:      Effort: Pulmonary effort is normal. No respiratory distress. Genitourinary:         Comments: Significant fungal infection of skin. No vaginal involvement. Not hot to the touch nor purulent. Skin:     General: Skin is warm and dry. Neurological:      Mental Status: She is alert and oriented to person, place, and time.    Psychiatric: Behavior: Behavior normal.           Current Outpatient Medications   Medication Sig    amLODIPine (NORVASC) 5 mg tablet TAKE 1 TABLET EVERY DAY    lisinopriL (PRINIVIL, ZESTRIL) 40 mg tablet TAKE 1 TABLET EVERY DAY    simvastatin (ZOCOR) 20 mg tablet TAKE 1 TABLET EVERY NIGHT    ergocalciferol (ERGOCALCIFEROL) 1,250 mcg (50,000 unit) capsule TAKE 1 CAPSULE BY MOUTH EVERY 7 DAYS.  metoprolol succinate (TOPROL-XL) 50 mg XL tablet TAKE 1 TABLET EVERY DAY    clonazePAM (KLONOPIN) 1 mg tablet take 1 tablet by mouth twice a day    ketoconazole (NIZORAL) 2 % topical cream APPLY SMALL AMOUNT EXTERNALLY TO THE AFFECTED AREA TWICE DAILY (Patient not taking: Reported on 5/12/2022)     No current facility-administered medications for this visit. Past Medical History:   Diagnosis Date    Anxiety     Constipation     Difficulty swallowing pills     Headache     Hypertension     Muscle pain       Past Surgical History:   Procedure Laterality Date    HX CATARACT REMOVAL        Social History     Tobacco Use    Smoking status: Current Every Day Smoker     Packs/day: 1.50     Years: 50.00     Pack years: 75.00    Smokeless tobacco: Never Used   Substance Use Topics    Alcohol use: No      Family History   Problem Relation Age of Onset    Dementia Mother     Anxiety Mother     Pulmonary Embolism Father     Hypertension Father         Allergies   Allergen Reactions    Ciprofloxacin Unknown (comments)    Lidocaine Shortness of Breath    Pcn [Penicillins] Unknown (comments)    Shellfish Derived Hives    Sulfa (Sulfonamide Antibiotics) Unknown (comments)        Assessment/Plan  Diagnoses and all orders for this visit:    1. Essential hypertension-continue current therapy. -     CBC WITH AUTOMATED DIFF; Future  -     METABOLIC PANEL, COMPREHENSIVE; Future    2. Mixed hyperlipidemia-repeat lipids  -     CBC WITH AUTOMATED DIFF; Future  -     METABOLIC PANEL, COMPREHENSIVE;  Future  -     LIPID PANEL; Future    3. IFG (impaired fasting glucose)-given acute worsening of fungal infection, will repeat A1c's.  -     HEMOGLOBIN A1C WITH EAG; Future    4. Anxiety= congratulations on being able to cut way back on her benzo use. 5. Tinea cruris-rash consistent with significant fungal infection. Will place on Lamisil for 21 days as well as a topical.  She will let us know if things or not improving or if we have to extend therapy. -     terbinafine HCL (LAMISIL) 250 mg tablet; Take 1 Tablet by mouth daily for 21 days. -     clotrimazole-betamethasone (LOTRISONE) topical cream; Apply  to affected area two (2) times a day. 6. Low back pain, unspecified back pain laterality, unspecified chronicity, unspecified whether sciatica present-chronic issue. Ports that she is considering CBD gummies. Has been in physical therapy in the past            Meghan Mchugh MD  5/12/2022    This note was created with the help of speech recognition software Kailey Irving) and may contain some 'sound alike' errors.

## 2022-05-13 LAB
ALBUMIN SERPL-MCNC: 4.2 G/DL (ref 3.7–4.7)
ALBUMIN/GLOB SERPL: 1.6 {RATIO} (ref 1.2–2.2)
ALP SERPL-CCNC: 60 IU/L (ref 44–121)
ALT SERPL-CCNC: 9 IU/L (ref 0–32)
AST SERPL-CCNC: 14 IU/L (ref 0–40)
BASOPHILS # BLD AUTO: 0.1 X10E3/UL (ref 0–0.2)
BASOPHILS NFR BLD AUTO: 1 %
BILIRUB SERPL-MCNC: 0.5 MG/DL (ref 0–1.2)
BUN SERPL-MCNC: 6 MG/DL (ref 8–27)
BUN/CREAT SERPL: 7 (ref 12–28)
CALCIUM SERPL-MCNC: 9.7 MG/DL (ref 8.7–10.3)
CHLORIDE SERPL-SCNC: 101 MMOL/L (ref 96–106)
CHOLEST SERPL-MCNC: 127 MG/DL (ref 100–199)
CO2 SERPL-SCNC: 25 MMOL/L (ref 20–29)
CREAT SERPL-MCNC: 0.82 MG/DL (ref 0.57–1)
EGFR: 74 ML/MIN/1.73
EOSINOPHIL # BLD AUTO: 0.1 X10E3/UL (ref 0–0.4)
EOSINOPHIL NFR BLD AUTO: 1 %
ERYTHROCYTE [DISTWIDTH] IN BLOOD BY AUTOMATED COUNT: 12.6 % (ref 11.7–15.4)
EST. AVERAGE GLUCOSE BLD GHB EST-MCNC: 105 MG/DL
GLOBULIN SER CALC-MCNC: 2.6 G/DL (ref 1.5–4.5)
GLUCOSE SERPL-MCNC: 99 MG/DL (ref 65–99)
HBA1C MFR BLD: 5.3 % (ref 4.8–5.6)
HCT VFR BLD AUTO: 43.9 % (ref 34–46.6)
HDLC SERPL-MCNC: 34 MG/DL
HGB BLD-MCNC: 14.7 G/DL (ref 11.1–15.9)
IMM GRANULOCYTES # BLD AUTO: 0 X10E3/UL (ref 0–0.1)
IMM GRANULOCYTES NFR BLD AUTO: 0 %
IMP & REVIEW OF LAB RESULTS: NORMAL
LDLC SERPL CALC-MCNC: 69 MG/DL (ref 0–99)
LYMPHOCYTES # BLD AUTO: 2.1 X10E3/UL (ref 0.7–3.1)
LYMPHOCYTES NFR BLD AUTO: 24 %
MCH RBC QN AUTO: 31.5 PG (ref 26.6–33)
MCHC RBC AUTO-ENTMCNC: 33.5 G/DL (ref 31.5–35.7)
MCV RBC AUTO: 94 FL (ref 79–97)
MONOCYTES # BLD AUTO: 0.7 X10E3/UL (ref 0.1–0.9)
MONOCYTES NFR BLD AUTO: 8 %
NEUTROPHILS # BLD AUTO: 5.9 X10E3/UL (ref 1.4–7)
NEUTROPHILS NFR BLD AUTO: 66 %
PLATELET # BLD AUTO: 234 X10E3/UL (ref 150–450)
POTASSIUM SERPL-SCNC: 3.6 MMOL/L (ref 3.5–5.2)
PROT SERPL-MCNC: 6.8 G/DL (ref 6–8.5)
RBC # BLD AUTO: 4.66 X10E6/UL (ref 3.77–5.28)
SODIUM SERPL-SCNC: 141 MMOL/L (ref 134–144)
TRIGL SERPL-MCNC: 137 MG/DL (ref 0–149)
VLDLC SERPL CALC-MCNC: 24 MG/DL (ref 5–40)
WBC # BLD AUTO: 8.9 X10E3/UL (ref 3.4–10.8)

## 2022-05-16 DIAGNOSIS — B35.6 TINEA CRURIS: ICD-10-CM

## 2022-05-16 RX ORDER — CLOTRIMAZOLE AND BETAMETHASONE DIPROPIONATE 10; .64 MG/G; MG/G
CREAM TOPICAL
Qty: 15 G | Refills: 0 | Status: SHIPPED | OUTPATIENT
Start: 2022-05-16 | End: 2022-05-27

## 2022-05-24 DIAGNOSIS — E78.2 MIXED HYPERLIPIDEMIA: ICD-10-CM

## 2022-05-24 DIAGNOSIS — E55.9 VITAMIN D DEFICIENCY: ICD-10-CM

## 2022-05-24 DIAGNOSIS — I10 ESSENTIAL HYPERTENSION: ICD-10-CM

## 2022-05-25 RX ORDER — LISINOPRIL 40 MG/1
TABLET ORAL
Qty: 90 TABLET | Refills: 0 | Status: SHIPPED | OUTPATIENT
Start: 2022-05-25

## 2022-05-25 RX ORDER — SIMVASTATIN 20 MG/1
TABLET, FILM COATED ORAL
Qty: 90 TABLET | Refills: 0 | Status: SHIPPED | OUTPATIENT
Start: 2022-05-25

## 2022-05-25 RX ORDER — ERGOCALCIFEROL 1.25 MG/1
CAPSULE ORAL
Qty: 12 CAPSULE | Refills: 0 | Status: SHIPPED | OUTPATIENT
Start: 2022-05-25

## 2022-05-25 RX ORDER — AMLODIPINE BESYLATE 5 MG/1
TABLET ORAL
Qty: 90 TABLET | Refills: 0 | Status: SHIPPED | OUTPATIENT
Start: 2022-05-25

## 2022-05-27 DIAGNOSIS — B35.6 TINEA CRURIS: ICD-10-CM

## 2022-05-27 RX ORDER — CLOTRIMAZOLE AND BETAMETHASONE DIPROPIONATE 10; .64 MG/G; MG/G
CREAM TOPICAL
Qty: 15 G | Refills: 0 | Status: SHIPPED | OUTPATIENT
Start: 2022-05-27 | End: 2022-06-13

## 2022-06-02 ENCOUNTER — TELEPHONE (OUTPATIENT)
Dept: INTERNAL MEDICINE CLINIC | Age: 77
End: 2022-06-02

## 2022-06-02 DIAGNOSIS — B35.6 TINEA CRURIS: ICD-10-CM

## 2022-06-02 NOTE — TELEPHONE ENCOUNTER
Patient called to state she has finished taking terbinafine and wanted to let the nurse know per the doctor's request.

## 2022-06-06 RX ORDER — CLOTRIMAZOLE AND BETAMETHASONE DIPROPIONATE 10; .64 MG/G; MG/G
CREAM TOPICAL
Qty: 15 G | Refills: 0 | Status: CANCELLED | OUTPATIENT
Start: 2022-06-06

## 2022-06-06 NOTE — TELEPHONE ENCOUNTER
Patient called to state it helped greatly. Please call back to advise if she should still take it as there is some itching and discoloration still.

## 2022-06-10 DIAGNOSIS — B35.6 TINEA CRURIS: ICD-10-CM

## 2022-06-13 RX ORDER — CLOTRIMAZOLE AND BETAMETHASONE DIPROPIONATE 10; .64 MG/G; MG/G
CREAM TOPICAL
Qty: 15 G | Refills: 1 | Status: SHIPPED | OUTPATIENT
Start: 2022-06-13

## 2022-07-11 DIAGNOSIS — I10 ESSENTIAL HYPERTENSION: ICD-10-CM

## 2022-07-11 RX ORDER — METOPROLOL SUCCINATE 50 MG/1
TABLET, EXTENDED RELEASE ORAL
Qty: 90 TABLET | Refills: 2 | Status: SHIPPED | OUTPATIENT
Start: 2022-07-11

## 2022-11-02 ENCOUNTER — TELEPHONE (OUTPATIENT)
Dept: INTERNAL MEDICINE CLINIC | Age: 77
End: 2022-11-02

## 2022-11-04 NOTE — TELEPHONE ENCOUNTER
----- Message from Laura Wolfe sent at 11/2/2022 12:09 PM EDT -----  Subject: Message to Provider    QUESTIONS  Information for Provider? patient needing to know if fax was received from   [address] [place] [address] call her asap to let her   know-please let patient know when she can   ---------------------------------------------------------------------------  --------------  [address]  [phone]; OK to leave message on voicemail  ---------------------------------------------------------------------------  --------------  SCRIPT ANSWERS  Relationship to Patient?  Self

## 2022-11-15 ENCOUNTER — OFFICE VISIT (OUTPATIENT)
Dept: INTERNAL MEDICINE CLINIC | Age: 77
End: 2022-11-15
Payer: MEDICARE

## 2022-11-15 VITALS
BODY MASS INDEX: 22.23 KG/M2 | HEART RATE: 76 BPM | RESPIRATION RATE: 16 BRPM | DIASTOLIC BLOOD PRESSURE: 81 MMHG | TEMPERATURE: 97.8 F | OXYGEN SATURATION: 97 % | SYSTOLIC BLOOD PRESSURE: 132 MMHG | WEIGHT: 133.4 LBS | HEIGHT: 65 IN

## 2022-11-15 DIAGNOSIS — R29.898 WEAKNESS OF BOTH LEGS: ICD-10-CM

## 2022-11-15 DIAGNOSIS — M62.81 MUSCLE WEAKNESS: ICD-10-CM

## 2022-11-15 DIAGNOSIS — R32 URINARY INCONTINENCE, UNSPECIFIED TYPE: ICD-10-CM

## 2022-11-15 DIAGNOSIS — M51.36 DDD (DEGENERATIVE DISC DISEASE), LUMBAR: Primary | ICD-10-CM

## 2022-11-15 DIAGNOSIS — E87.6 HYPOKALEMIA: ICD-10-CM

## 2022-11-15 PROCEDURE — 99214 OFFICE O/P EST MOD 30 MIN: CPT | Performed by: INTERNAL MEDICINE

## 2022-11-15 PROCEDURE — G8400 PT W/DXA NO RESULTS DOC: HCPCS | Performed by: INTERNAL MEDICINE

## 2022-11-15 PROCEDURE — G8754 DIAS BP LESS 90: HCPCS | Performed by: INTERNAL MEDICINE

## 2022-11-15 PROCEDURE — G8420 CALC BMI NORM PARAMETERS: HCPCS | Performed by: INTERNAL MEDICINE

## 2022-11-15 PROCEDURE — 1090F PRES/ABSN URINE INCON ASSESS: CPT | Performed by: INTERNAL MEDICINE

## 2022-11-15 PROCEDURE — G8427 DOCREV CUR MEDS BY ELIG CLIN: HCPCS | Performed by: INTERNAL MEDICINE

## 2022-11-15 PROCEDURE — G8752 SYS BP LESS 140: HCPCS | Performed by: INTERNAL MEDICINE

## 2022-11-15 PROCEDURE — 1101F PT FALLS ASSESS-DOCD LE1/YR: CPT | Performed by: INTERNAL MEDICINE

## 2022-11-15 PROCEDURE — G8536 NO DOC ELDER MAL SCRN: HCPCS | Performed by: INTERNAL MEDICINE

## 2022-11-15 PROCEDURE — G8510 SCR DEP NEG, NO PLAN REQD: HCPCS | Performed by: INTERNAL MEDICINE

## 2022-11-15 RX ORDER — DICLOFENAC SODIUM 75 MG/1
75 TABLET, DELAYED RELEASE ORAL 2 TIMES DAILY
Qty: 60 TABLET | Refills: 0 | Status: SHIPPED | OUTPATIENT
Start: 2022-11-15

## 2022-11-15 RX ORDER — METHYLPREDNISOLONE 4 MG/1
TABLET ORAL
Qty: 1 DOSE PACK | Refills: 0 | Status: SHIPPED | OUTPATIENT
Start: 2022-11-15

## 2022-11-15 NOTE — PROGRESS NOTES
Zaina Frazier is a 65 y.o. female who presents today for Pain (Chronic) (Rm21// pt presenting today for back pain and (L) leg and knee pain; after having PT; having trouble controlling urine)  . She has a history of   Patient Active Problem List   Diagnosis Code    Essential hypertension I10    Mixed hyperlipidemia E78.2    Vitamin D deficiency E55.9    Pre-diabetes R73.03    Tobacco abuse Z72.0    Family history of abdominal aortic aneurysm (AAA) Z82.49    Anxiety F41.9    Advanced care planning/counseling discussion Z71.89   . Today patient is here for an acute visit. Low Back Pain:  Zaina Frazier is a 65 y.o. female who complains of low back pain bilaterally for 3 month(s), is positional with bending or lifting, with radiation down the legs. Severity of pain is 9 out of 10.  numbness, tingling, weakness is present in bilateral  leg(s)/ foot. The patient denies fevers, chills or sweats. The patient denies bowel  incontinence, but has had some bladder incontinence. Denies any saddle numbness. Seen by ortho in August and sent to PT. Since reports acute decline. She reports that the evening of her initial PT evaluation she started feeling weakness in her legs. She has been virtually in bed for the last 2 and half months. Denies any numbness or tingling to her toes. Denies any new injury. Denies any falls. Does sometimes ambulate using a cane or a walker at home but this has been a drastic change. Does report some bladder incontinence. Denies any fecal incontinence. Denies any saddle numbness. ROS  Review of Systems   Constitutional:  Negative for chills, fever and weight loss. HENT:  Negative for congestion and sore throat. Eyes:  Negative for blurred vision, double vision and photophobia. Respiratory:  Negative for cough and shortness of breath. Cardiovascular:  Negative for chest pain, palpitations and leg swelling.    Gastrointestinal:  Negative for abdominal pain, constipation, diarrhea, heartburn, nausea and vomiting. Genitourinary:  Negative for dysuria, frequency and urgency. Occasional urinary incontinence   Musculoskeletal:  Positive for back pain, joint pain and myalgias. Skin:  Negative for rash. Neurological:  Positive for weakness. Negative for headaches. Endo/Heme/Allergies:  Does not bruise/bleed easily. Psychiatric/Behavioral:  Negative for memory loss and suicidal ideas. Visit Vitals  /81 (BP 1 Location: Left upper arm, BP Patient Position: Sitting, BP Cuff Size: Adult)   Pulse 76   Temp 97.8 °F (36.6 °C) (Oral)   Resp 16   Ht 5' 5\" (1.651 m)   Wt 133 lb 6.4 oz (60.5 kg)   SpO2 97%   BMI 22.20 kg/m²       Physical Exam  Constitutional:       Appearance: She is well-developed. HENT:      Head: Normocephalic and atraumatic. Right Ear: Tympanic membrane normal.   Cardiovascular:      Rate and Rhythm: Normal rate and regular rhythm. Heart sounds: No murmur heard. Pulmonary:      Effort: Pulmonary effort is normal. No respiratory distress. Musculoskeletal:      Comments: Generalized strength strength decreased at hip and knee. No focal muscle wasting. Normal deep tendon reflexes. Able to ambulate, but has difficulties with leg lifting. Skin:     General: Skin is warm and dry. Neurological:      Mental Status: She is alert and oriented to person, place, and time. Psychiatric:         Behavior: Behavior normal.         Current Outpatient Medications   Medication Sig    metoprolol succinate (TOPROL-XL) 50 mg XL tablet TAKE 1 TABLET EVERY DAY    ergocalciferol (ERGOCALCIFEROL) 1,250 mcg (50,000 unit) capsule TAKE 1 CAPSULE EVERY 7 DAYS.     lisinopriL (PRINIVIL, ZESTRIL) 40 mg tablet TAKE 1 TABLET EVERY DAY    amLODIPine (NORVASC) 5 mg tablet TAKE 1 TABLET EVERY DAY    simvastatin (ZOCOR) 20 mg tablet TAKE 1 TABLET EVERY NIGHT    clonazePAM (KLONOPIN) 1 mg tablet take 1 tablet by mouth twice a day clotrimazole-betamethasone (LOTRISONE) topical cream APPLY TOPICALLY TO THE AFFECTED AREA TWICE DAILY (Patient not taking: Reported on 11/15/2022)     No current facility-administered medications for this visit. Past Medical History:   Diagnosis Date    Anxiety     Constipation     Difficulty swallowing pills     Headache     Hypertension     Muscle pain       Past Surgical History:   Procedure Laterality Date    HX CATARACT REMOVAL        Social History     Tobacco Use    Smoking status: Every Day     Packs/day: 1.50     Years: 50.00     Pack years: 75.00     Types: Cigarettes    Smokeless tobacco: Never   Substance Use Topics    Alcohol use: No      Family History   Problem Relation Age of Onset    Dementia Mother     Anxiety Mother     Pulmonary Embolism Father     Hypertension Father         Allergies   Allergen Reactions    Ciprofloxacin Unknown (comments)    Lidocaine Shortness of Breath    Pcn [Penicillins] Unknown (comments)    Shellfish Derived Hives    Sulfa (Sulfonamide Antibiotics) Unknown (comments)        Assessment/Plan  Diagnoses and all orders for this visit:    1. DDD (degenerative disc disease), lumbar-acute neurological changes have been ongoing for about 2 months. I am concerned as she was previously ambulating on her own easily. We will check blood work but more importantly check an MRI. Patient has been having some urinary incontinence which is brand-new. This is all concerning for potential spinal cord involvement. Patient to use extreme caution in the meantime. She will schedule to see me several days after her MRI get scheduled. She will need an open MRI.  -     MRI LUMB SPINE WO CONT; Future  -     CK; Future  -     SED RATE (ESR); Future  -     C REACTIVE PROTEIN, QT; Future  -     METABOLIC PANEL, COMPREHENSIVE; Future  -     methylPREDNISolone (MEDROL DOSEPACK) 4 mg tablet; As directed. -     diclofenac EC (VOLTAREN) 75 mg EC tablet;  Take 1 Tablet by mouth two (2) times a day. Start after steroid. 2. Weakness of both legs  -     MRI LUMB SPINE WO CONT; Future  -     CK; Future  -     SED RATE (ESR); Future  -     C REACTIVE PROTEIN, QT; Future  -     METABOLIC PANEL, COMPREHENSIVE; Future  -     methylPREDNISolone (MEDROL DOSEPACK) 4 mg tablet; As directed. -     diclofenac EC (VOLTAREN) 75 mg EC tablet; Take 1 Tablet by mouth two (2) times a day. Start after steroid. 3. Muscle weakness  -     MRI LUMB SPINE WO CONT; Future  -     CK; Future  -     SED RATE (ESR); Future  -     C REACTIVE PROTEIN, QT; Future  -     METABOLIC PANEL, COMPREHENSIVE; Future  -     methylPREDNISolone (MEDROL DOSEPACK) 4 mg tablet; As directed. -     diclofenac EC (VOLTAREN) 75 mg EC tablet; Take 1 Tablet by mouth two (2) times a day. Start after steroid. 4. Urinary incontinence, unspecified type  -     MRI LUMB SPINE WO CONT; Future  -     methylPREDNISolone (MEDROL DOSEPACK) 4 mg tablet; As directed. -     diclofenac EC (VOLTAREN) 75 mg EC tablet; Take 1 Tablet by mouth two (2) times a day. Start after steroid. Johnny Givens MD  11/15/2022    This note was created with the help of speech recognition software Iris Hogue) and may contain some 'sound alike' errors.

## 2022-11-15 NOTE — PROGRESS NOTES
Angeli Socrates  Identified pt with two pt identifiers(name and ). Chief Complaint   Patient presents with    Pain (Chronic)     Rm21// pt presenting today for back pain and (L) leg and knee pain; after having PT; having trouble controlling urine       1. Have you been to the ER, urgent care clinic since your last visit? Hospitalized since your last visit? NO    2. Have you seen or consulted any other health care providers outside of the [address] [place] [address] since your last visit? Include any pap smears or colon screening. NO      Provider notified of reason for visit, vitals and flowsheets obtained on patients.      Patient received paperwork for advance directive during previous visit but has not completed at this time     Reviewed record In preparation for visit, huddled with provider and have obtained necessary documentation      Health Maintenance Due   Topic    COVID-19 Vaccine (1)    Pneumococcal 65+ years (1 - PCV)    Shingrix Vaccine Age 50> (1 of 2)    Low dose CT lung screening     Bone Densitometry (Dexa) Screening     Medicare Yearly Exam     Flu Vaccine (1)       Wt Readings from Last 3 Encounters:   11/15/22 133 lb 6.4 oz (60.5 kg)   22 144 lb 9.6 oz (65.6 kg)   10/26/20 148 lb 6.4 oz (67.3 kg)     Temp Readings from Last 3 Encounters:   11/15/22 97.8 °F (36.6 °C) (Oral)   22 97.9 °F (36.6 °C) (Oral)   10/26/20 98.2 °F (36.8 °C) (Oral)     BP Readings from Last 3 Encounters:   11/15/22 132/81   22 138/80   10/26/20 123/72     Pulse Readings from Last 3 Encounters:   11/15/22 76   22 67   10/26/20 (!) 56     Vitals:    11/15/22 1202   BP: 132/81   Pulse: 76   Resp: 16   Temp: 97.8 °F (36.6 °C)   TempSrc: Oral   SpO2: 97%   Weight: 133 lb 6.4 oz (60.5 kg)   Height: 5' 5\" (1.651 m)   PainSc:   9   PainLoc: Leg         Learning Assessment:  :     Learning Assessment 2017   PRIMARY LEARNER Patient   PRIMARY LANGUAGE ENGLISH   LEARNER PREFERENCE PRIMARY LISTENING READING     DEMONSTRATION   ANSWERED BY Patient   RELATIONSHIP SELF       Depression Screening:  :     3 most recent PHQ Screens 11/15/2022   Little interest or pleasure in doing things Not at all   Feeling down, depressed, irritable, or hopeless Not at all   Total Score PHQ 2 0   Trouble falling or staying asleep, or sleeping too much -   Feeling tired or having little energy -   Poor appetite, weight loss, or overeating -   Feeling bad about yourself - or that you are a failure or have let yourself or your family down -   Trouble concentrating on things such as school, work, reading, or watching TV -   Moving or speaking so slowly that other people could have noticed; or the opposite being so fidgety that others notice -   Thoughts of being better off dead, or hurting yourself in some way -   PHQ 9 Score -   How difficult have these problems made it for you to do your work, take care of your home and get along with others -       Fall Risk Assessment:  :     Fall Risk Assessment, last 12 mths 5/12/2022   Able to walk? Yes   Fall in past 12 months? 0   Do you feel unsteady? 0   Are you worried about falling 0   Number of falls in past 12 months -   Fall with injury? -       Abuse Screening:  :     Abuse Screening Questionnaire 2/21/2019 10/10/2018 6/2/2017   Do you ever feel afraid of your partner? N N N   Are you in a relationship with someone who physically or mentally threatens you? N N N   Is it safe for you to go home?  Y Y Y       ADL Screening:  :     ADL Assessment 10/10/2018   Feeding yourself No Help Needed   Getting from bed to chair No Help Needed   Getting dressed No Help Needed   Bathing or showering No Help Needed   Walk across the room (includes cane/walker) No Help Needed   Using the telphone No Help Needed   Taking your medications No Help Needed   Preparing meals No Help Needed   Managing money (expenses/bills) No Help Needed   Moderately strenuous housework (laundry) No Help Needed   Shopping for personal items (toiletries/medicines) No Help Needed   Shopping for groceries No Help Needed   Driving No Help Needed   Climbing a flight of stairs No Help Needed   Getting to places beyond walking distances No Help Needed         Medication reconciliation up to date and corrected with patient at this time.

## 2022-11-16 ENCOUNTER — TELEPHONE (OUTPATIENT)
Dept: INTERNAL MEDICINE CLINIC | Age: 77
End: 2022-11-16

## 2022-11-16 DIAGNOSIS — E87.6 HYPOKALEMIA: ICD-10-CM

## 2022-11-16 DIAGNOSIS — M62.81 MUSCLE WEAKNESS: Primary | ICD-10-CM

## 2022-11-16 DIAGNOSIS — E87.6 HYPOKALEMIA: Primary | ICD-10-CM

## 2022-11-16 LAB
ALBUMIN SERPL-MCNC: 3.3 G/DL (ref 3.5–5)
ALBUMIN/GLOB SERPL: 1.1 {RATIO} (ref 1.1–2.2)
ALP SERPL-CCNC: 89 U/L (ref 45–117)
ALT SERPL-CCNC: 12 U/L (ref 12–78)
ANION GAP SERPL CALC-SCNC: 8 MMOL/L (ref 5–15)
AST SERPL-CCNC: 11 U/L (ref 15–37)
BILIRUB SERPL-MCNC: 0.6 MG/DL (ref 0.2–1)
BUN SERPL-MCNC: 8 MG/DL (ref 6–20)
BUN/CREAT SERPL: 10 (ref 12–20)
CALCIUM SERPL-MCNC: 10.1 MG/DL (ref 8.5–10.1)
CHLORIDE SERPL-SCNC: 103 MMOL/L (ref 97–108)
CK SERPL-CCNC: 28 U/L (ref 26–192)
CO2 SERPL-SCNC: 30 MMOL/L (ref 21–32)
CREAT SERPL-MCNC: 0.82 MG/DL (ref 0.55–1.02)
CRP SERPL-MCNC: 2.33 MG/DL (ref 0–0.6)
ERYTHROCYTE [SEDIMENTATION RATE] IN BLOOD: 26 MM/HR (ref 0–30)
GLOBULIN SER CALC-MCNC: 3.1 G/DL (ref 2–4)
GLUCOSE SERPL-MCNC: 104 MG/DL (ref 65–100)
POTASSIUM SERPL-SCNC: 2.8 MMOL/L (ref 3.5–5.1)
PROT SERPL-MCNC: 6.4 G/DL (ref 6.4–8.2)
SODIUM SERPL-SCNC: 141 MMOL/L (ref 136–145)

## 2022-11-16 RX ORDER — POTASSIUM CHLORIDE 20 MEQ/1
20 TABLET, EXTENDED RELEASE ORAL 2 TIMES DAILY
Qty: 60 TABLET | Refills: 1 | Status: SHIPPED | OUTPATIENT
Start: 2022-11-16

## 2022-11-16 NOTE — TELEPHONE ENCOUNTER
Called patient due to significant hypokalemia noted on blood work. Patient is not feeling any worse. She has not been able to  steroid Dosepak. I informed her not to start the steroids given her hypokalemia. Unclear of cause of hypokalemia. She is not on any diuretics. She will start on twice daily dosing of potassium and try to increase her dietary potassium and have blood work repeated next week. I will be in touch then regarding medications.

## 2022-11-23 ENCOUNTER — TELEPHONE (OUTPATIENT)
Dept: INTERNAL MEDICINE CLINIC | Age: 77
End: 2022-11-23

## 2022-11-23 LAB
ANION GAP SERPL CALC-SCNC: 5 MMOL/L (ref 5–15)
BUN SERPL-MCNC: 9 MG/DL (ref 6–20)
BUN/CREAT SERPL: 11 (ref 12–20)
CALCIUM SERPL-MCNC: 9.3 MG/DL (ref 8.5–10.1)
CHLORIDE SERPL-SCNC: 104 MMOL/L (ref 97–108)
CO2 SERPL-SCNC: 27 MMOL/L (ref 21–32)
COMMENT, HOLDF: NORMAL
CREAT SERPL-MCNC: 0.79 MG/DL (ref 0.55–1.02)
CREAT UR-MCNC: 54 MG/DL
GLUCOSE SERPL-MCNC: 102 MG/DL (ref 65–100)
MAGNESIUM SERPL-MCNC: 1.9 MG/DL (ref 1.6–2.4)
POTASSIUM SERPL-SCNC: 4.2 MMOL/L (ref 3.5–5.1)
POTASSIUM UR-SCNC: 35 MMOL/L
SAMPLES BEING HELD,HOLD: NORMAL
SODIUM SERPL-SCNC: 136 MMOL/L (ref 136–145)

## 2022-11-23 RX ORDER — NITROFURANTOIN 25; 75 MG/1; MG/1
100 CAPSULE ORAL 2 TIMES DAILY
Qty: 10 CAPSULE | Refills: 0 | Status: SHIPPED | OUTPATIENT
Start: 2022-11-23 | End: 2022-11-28

## 2022-11-23 NOTE — TELEPHONE ENCOUNTER
Reviewed labs. Patient is feeling a bit better. Still some weakness. Continue potassium  Having s/s of cystitis. We discussed increasing fluid intake and I will call in [facility] for her.

## 2022-11-30 DIAGNOSIS — R29.898 WEAKNESS OF BOTH LEGS: Primary | ICD-10-CM

## 2022-11-30 RX ORDER — LORAZEPAM 1 MG/1
TABLET ORAL
Qty: 2 TABLET | Refills: 0 | Status: SHIPPED | OUTPATIENT
Start: 2022-11-30

## 2022-12-02 ENCOUNTER — HOSPITAL ENCOUNTER (OUTPATIENT)
Dept: MRI IMAGING | Age: 77
End: 2022-12-02
Attending: INTERNAL MEDICINE
Payer: MEDICARE

## 2022-12-02 DIAGNOSIS — R32 URINARY INCONTINENCE, UNSPECIFIED TYPE: ICD-10-CM

## 2022-12-02 DIAGNOSIS — M51.36 DDD (DEGENERATIVE DISC DISEASE), LUMBAR: ICD-10-CM

## 2022-12-02 DIAGNOSIS — R29.898 WEAKNESS OF BOTH LEGS: ICD-10-CM

## 2022-12-02 DIAGNOSIS — M62.81 MUSCLE WEAKNESS: ICD-10-CM

## 2022-12-02 PROCEDURE — 72148 MRI LUMBAR SPINE W/O DYE: CPT

## 2022-12-05 ENCOUNTER — TELEPHONE (OUTPATIENT)
Dept: INTERNAL MEDICINE CLINIC | Age: 77
End: 2022-12-05

## 2022-12-05 DIAGNOSIS — N28.89 RENAL MASS, LEFT: Primary | ICD-10-CM

## 2022-12-05 DIAGNOSIS — I71.40 ABDOMINAL AORTIC ANEURYSM (AAA) WITHOUT RUPTURE, UNSPECIFIED PART: ICD-10-CM

## 2022-12-05 NOTE — TELEPHONE ENCOUNTER
Had lengthy call with patient and her  regarding MRI findings. This includes a left large renal mass concerning for renal cell carcinoma. A relatively large abdominal aortic aneurysm as well as some degenerative changes in her spine. She has not yet started her diclofenac. Symptoms are unchanged. We discussed needing to repeat a CT scan of abdomen pelvis to further characterize renal mass as well as aneurysm. Patient is quite concerned about what is going on, which is understandable. We discussed that neck step was to do the CT scan and for her to see me to follow-up. We will also get her an appointment with urology in the coming 2 weeks.

## 2022-12-06 NOTE — TELEPHONE ENCOUNTER
Patient scheduled ct 12/12 and appointment here in office 12/13 faxed over information and called [facility] to help get patient an appointment, they will call patient with appointment.

## 2022-12-09 ENCOUNTER — TELEPHONE (OUTPATIENT)
Dept: INTERNAL MEDICINE CLINIC | Age: 77
End: 2022-12-09

## 2022-12-12 ENCOUNTER — HOSPITAL ENCOUNTER (OUTPATIENT)
Dept: CT IMAGING | Age: 77
Discharge: HOME | End: 2022-12-12
Attending: INTERNAL MEDICINE
Payer: MEDICARE

## 2022-12-12 DIAGNOSIS — N28.89 RENAL MASS, LEFT: ICD-10-CM

## 2022-12-12 DIAGNOSIS — I71.40 ABDOMINAL AORTIC ANEURYSM (AAA) WITHOUT RUPTURE, UNSPECIFIED PART: ICD-10-CM

## 2022-12-12 PROCEDURE — 74176 CT ABD & PELVIS W/O CONTRAST: CPT

## 2022-12-13 ENCOUNTER — OFFICE VISIT (OUTPATIENT)
Dept: INTERNAL MEDICINE CLINIC | Age: 77
End: 2022-12-13
Payer: MEDICARE

## 2022-12-13 VITALS
BODY MASS INDEX: 21.33 KG/M2 | HEIGHT: 65 IN | RESPIRATION RATE: 16 BRPM | WEIGHT: 128 LBS | OXYGEN SATURATION: 98 % | DIASTOLIC BLOOD PRESSURE: 66 MMHG | SYSTOLIC BLOOD PRESSURE: 103 MMHG | HEART RATE: 67 BPM

## 2022-12-13 DIAGNOSIS — N28.89 RENAL MASS, LEFT: Primary | ICD-10-CM

## 2022-12-13 DIAGNOSIS — I10 ESSENTIAL HYPERTENSION: ICD-10-CM

## 2022-12-13 DIAGNOSIS — E87.6 HYPOKALEMIA: ICD-10-CM

## 2022-12-13 DIAGNOSIS — I71.40 ABDOMINAL AORTIC ANEURYSM (AAA) WITHOUT RUPTURE, UNSPECIFIED PART: ICD-10-CM

## 2022-12-13 DIAGNOSIS — M51.36 DDD (DEGENERATIVE DISC DISEASE), LUMBAR: ICD-10-CM

## 2022-12-13 DIAGNOSIS — B37.9 YEAST INFECTION: ICD-10-CM

## 2022-12-13 LAB
ANION GAP SERPL CALC-SCNC: 5 MMOL/L (ref 5–15)
BASOPHILS # BLD: 0.1 K/UL (ref 0–0.1)
BASOPHILS NFR BLD: 0 % (ref 0–1)
BUN SERPL-MCNC: 28 MG/DL (ref 6–20)
BUN/CREAT SERPL: 19 (ref 12–20)
CALCIUM SERPL-MCNC: 10 MG/DL (ref 8.5–10.1)
CHLORIDE SERPL-SCNC: 106 MMOL/L (ref 97–108)
CO2 SERPL-SCNC: 21 MMOL/L (ref 21–32)
CREAT SERPL-MCNC: 1.49 MG/DL (ref 0.55–1.02)
DIFFERENTIAL METHOD BLD: ABNORMAL
EOSINOPHIL # BLD: 0.2 K/UL (ref 0–0.4)
EOSINOPHIL NFR BLD: 1 % (ref 0–7)
ERYTHROCYTE [DISTWIDTH] IN BLOOD BY AUTOMATED COUNT: 13.7 % (ref 11.5–14.5)
GLUCOSE SERPL-MCNC: 100 MG/DL (ref 65–100)
HCT VFR BLD AUTO: 39.8 % (ref 35–47)
HGB BLD-MCNC: 12.3 G/DL (ref 11.5–16)
IMM GRANULOCYTES # BLD AUTO: 0 K/UL (ref 0–0.04)
IMM GRANULOCYTES NFR BLD AUTO: 0 % (ref 0–0.5)
LYMPHOCYTES # BLD: 1.4 K/UL (ref 0.8–3.5)
LYMPHOCYTES NFR BLD: 11 % (ref 12–49)
MCH RBC QN AUTO: 29.8 PG (ref 26–34)
MCHC RBC AUTO-ENTMCNC: 30.9 G/DL (ref 30–36.5)
MCV RBC AUTO: 96.4 FL (ref 80–99)
MONOCYTES # BLD: 1 K/UL (ref 0–1)
MONOCYTES NFR BLD: 8 % (ref 5–13)
NEUTS SEG # BLD: 9.8 K/UL (ref 1.8–8)
NEUTS SEG NFR BLD: 80 % (ref 32–75)
NRBC # BLD: 0 K/UL (ref 0–0.01)
NRBC BLD-RTO: 0 PER 100 WBC
PLATELET # BLD AUTO: 307 K/UL (ref 150–400)
PMV BLD AUTO: 10.4 FL (ref 8.9–12.9)
POTASSIUM SERPL-SCNC: 6.7 MMOL/L (ref 3.5–5.1)
RBC # BLD AUTO: 4.13 M/UL (ref 3.8–5.2)
SODIUM SERPL-SCNC: 132 MMOL/L (ref 136–145)
WBC # BLD AUTO: 12.5 K/UL (ref 3.6–11)

## 2022-12-13 PROCEDURE — 1090F PRES/ABSN URINE INCON ASSESS: CPT | Performed by: INTERNAL MEDICINE

## 2022-12-13 PROCEDURE — G8536 NO DOC ELDER MAL SCRN: HCPCS | Performed by: INTERNAL MEDICINE

## 2022-12-13 PROCEDURE — G8420 CALC BMI NORM PARAMETERS: HCPCS | Performed by: INTERNAL MEDICINE

## 2022-12-13 PROCEDURE — G8400 PT W/DXA NO RESULTS DOC: HCPCS | Performed by: INTERNAL MEDICINE

## 2022-12-13 PROCEDURE — G8427 DOCREV CUR MEDS BY ELIG CLIN: HCPCS | Performed by: INTERNAL MEDICINE

## 2022-12-13 PROCEDURE — 1101F PT FALLS ASSESS-DOCD LE1/YR: CPT | Performed by: INTERNAL MEDICINE

## 2022-12-13 PROCEDURE — 99214 OFFICE O/P EST MOD 30 MIN: CPT | Performed by: INTERNAL MEDICINE

## 2022-12-13 PROCEDURE — G8752 SYS BP LESS 140: HCPCS | Performed by: INTERNAL MEDICINE

## 2022-12-13 PROCEDURE — G8432 DEP SCR NOT DOC, RNG: HCPCS | Performed by: INTERNAL MEDICINE

## 2022-12-13 PROCEDURE — G8754 DIAS BP LESS 90: HCPCS | Performed by: INTERNAL MEDICINE

## 2022-12-13 RX ORDER — FLUCONAZOLE 150 MG/1
150 TABLET ORAL
Qty: 3 TABLET | Refills: 0 | Status: SHIPPED | OUTPATIENT
Start: 2022-12-13 | End: 2022-12-20

## 2022-12-13 RX ORDER — TRAMADOL HYDROCHLORIDE 50 MG/1
50 TABLET ORAL
Qty: 20 TABLET | Refills: 0 | Status: SHIPPED | OUTPATIENT
Start: 2022-12-13 | End: 2022-12-20

## 2022-12-13 NOTE — PROGRESS NOTES
Terri Diaz  Identified pt with two pt identifiers(name and ). Chief Complaint   Patient presents with    Follow-up     RM21// pt presenting today to discuss results and concerns of recent CT scan. 1. Have you been to the ER, urgent care clinic since your last visit? Hospitalized since your last visit? NO    2. Have you seen or consulted any other health care providers outside of the [address] [place] [address] since your last visit? Include any pap smears or colon screening. NO      Provider notified of reason for visit, vitals and flowsheets obtained on patients.      Patient received paperwork for advance directive during previous visit but has not completed at this time     Reviewed record In preparation for visit, huddled with provider and have obtained necessary documentation      Health Maintenance Due   Topic    COVID-19 Vaccine (1)    Pneumococcal 65+ years (1 - PCV)    Shingrix Vaccine Age 50> (1 of 2)    Low dose CT lung screening     Bone Densitometry (Dexa) Screening     Medicare Yearly Exam     Flu Vaccine (1)       Wt Readings from Last 3 Encounters:   22 128 lb (58.1 kg)   11/15/22 133 lb 6.4 oz (60.5 kg)   22 144 lb 9.6 oz (65.6 kg)     Temp Readings from Last 3 Encounters:   11/15/22 97.8 °F (36.6 °C) (Oral)   22 97.9 °F (36.6 °C) (Oral)   10/26/20 98.2 °F (36.8 °C) (Oral)     BP Readings from Last 3 Encounters:   22 103/66   11/15/22 132/81   22 138/80     Pulse Readings from Last 3 Encounters:   22 67   11/15/22 76   22 67     Vitals:    22 0920   BP: 103/66   Pulse: 67   Resp: 16   SpO2: 98%   Weight: 128 lb (58.1 kg)   Height: 5' 5\" (1.651 m)   PainSc:   7   PainLoc: Leg         Learning Assessment:  :     Learning Assessment 2017   PRIMARY LEARNER Patient   PRIMARY LANGUAGE ENGLISH   LEARNER PREFERENCE PRIMARY LISTENING     READING     DEMONSTRATION   ANSWERED BY Patient   RELATIONSHIP SELF       Depression Screening:  :     3 most recent PHQ Screens 11/15/2022   Little interest or pleasure in doing things Not at all   Feeling down, depressed, irritable, or hopeless Not at all   Total Score PHQ 2 0   Trouble falling or staying asleep, or sleeping too much -   Feeling tired or having little energy -   Poor appetite, weight loss, or overeating -   Feeling bad about yourself - or that you are a failure or have let yourself or your family down -   Trouble concentrating on things such as school, work, reading, or watching TV -   Moving or speaking so slowly that other people could have noticed; or the opposite being so fidgety that others notice -   Thoughts of being better off dead, or hurting yourself in some way -   PHQ 9 Score -   How difficult have these problems made it for you to do your work, take care of your home and get along with others -       Fall Risk Assessment:  :     Fall Risk Assessment, last 12 mths 5/12/2022   Able to walk? Yes   Fall in past 12 months? 0   Do you feel unsteady? 0   Are you worried about falling 0   Number of falls in past 12 months -   Fall with injury? -       Abuse Screening:  :     Abuse Screening Questionnaire 2/21/2019 10/10/2018 6/2/2017   Do you ever feel afraid of your partner? N N N   Are you in a relationship with someone who physically or mentally threatens you? N N N   Is it safe for you to go home?  Y Y Y       ADL Screening:  :     ADL Assessment 10/10/2018   Feeding yourself No Help Needed   Getting from bed to chair No Help Needed   Getting dressed No Help Needed   Bathing or showering No Help Needed   Walk across the room (includes cane/walker) No Help Needed   Using the telphone No Help Needed   Taking your medications No Help Needed   Preparing meals No Help Needed   Managing money (expenses/bills) No Help Needed   Moderately strenuous housework (laundry) No Help Needed   Shopping for personal items (toiletries/medicines) No Help Needed   Shopping for groceries No Help Needed   Driving No Help Needed   Climbing a flight of stairs No Help Needed   Getting to places beyond walking distances No Help Needed         Medication reconciliation up to date and corrected with patient at this time.

## 2022-12-13 NOTE — PROGRESS NOTES
Juan M Priest is a 65 y.o. female who presents today for Follow-up (RM21// pt presenting today to discuss results and concerns of recent CT scan.)  . She has a history of   Patient Active Problem List   Diagnosis Code    Essential hypertension I10    Mixed hyperlipidemia E78.2    Vitamin D deficiency E55.9    Pre-diabetes R73.03    Tobacco abuse Z72.0    Family history of abdominal aortic aneurysm (AAA) Z82.49    Anxiety F41.9    Advanced care planning/counseling discussion Z71.89   . Today patient is here for follow-up on CT scan. .     Renal mass: Patient did have a CT scan done yesterday which does show a renal mass that is 5 x 7.1 x 6.9 cm arising from the posterior pole of the left kidney. Unfortunately given shellfish allergy, they did not give her contrast.  This is very concerning for renal cell carcinoma. It also does appear to have metastatic lesions to the T and L spine as well as the sacrum. A long discussion with patient and her  regarding the findings. We also discussed that the neck step is seeing both urologist and oncologist to discuss further options and whether a biopsy is needed or not. AAA:  CT also does show a large fusiform infrarenal abdominal aortic aneurysm that is 5.8 x 6.4 cm. Given the size greater than 5.5 cm, I would like a vascular surgeon to evaluate scan to determine what the next steps are. Patient having a recurrence of yeast infection. Hypertension- BP stable. Hypertension ROS: taking medications as instructed, no medication side effects noted, no TIA's, no swelling of ankles     reports that she has been smoking cigarettes. She has a 75.00 pack-year smoking history. She has never used smokeless tobacco.    reports no history of alcohol use. BP Readings from Last 2 Encounters:   12/13/22 103/66   11/15/22 132/81         Lower extremity weakness as well as back pain has continued.   We discussed that this may be multifactorial in the renal mass as well as the metastatic foci and aneurysm may be contributing to this. Symptoms have slowly improved with diclofenac. We will repeat her kidney function today as well as potassium. We will also add a as needed tramadol. We urged her to monitor for constipation with this. ROS  Review of Systems   Constitutional:  Negative for chills, fever and weight loss. HENT:  Negative for congestion and sore throat. Eyes:  Negative for blurred vision, double vision and photophobia. Respiratory:  Negative for cough and shortness of breath. Cardiovascular:  Negative for chest pain, palpitations and leg swelling. Gastrointestinal:  Negative for abdominal pain, constipation, diarrhea, heartburn, nausea and vomiting. Genitourinary:  Negative for dysuria, frequency and urgency. Yeast infection   Musculoskeletal:  Positive for back pain and neck pain. Negative for joint pain and myalgias. Skin:  Negative for rash. Neurological:  Positive for weakness. Negative for headaches. Endo/Heme/Allergies:  Does not bruise/bleed easily. Psychiatric/Behavioral:  Negative for memory loss and suicidal ideas. Visit Vitals  /66 (BP 1 Location: Left upper arm, BP Patient Position: Sitting, BP Cuff Size: Adult)   Pulse 67   Resp 16   Ht 5' 5\" (1.651 m)   Wt 128 lb (58.1 kg)   SpO2 98%   BMI 21.30 kg/m²       Physical Exam  Constitutional:       Appearance: She is well-developed. HENT:      Head: Normocephalic and atraumatic. Cardiovascular:      Rate and Rhythm: Normal rate and regular rhythm. Heart sounds: No murmur heard. Pulmonary:      Effort: Pulmonary effort is normal. No respiratory distress. Abdominal:      General: Abdomen is flat. Palpations: Abdomen is soft. Musculoskeletal:      Comments: Moves all extremities. Uncomfortable standing up. No spinal point tenderness over lumbar spine. Skin:     General: Skin is warm and dry.    Neurological:      Mental Status: She is alert and oriented to person, place, and time. Psychiatric:         Behavior: Behavior normal.         Current Outpatient Medications   Medication Sig    potassium chloride (K-DUR, KLOR-CON M20) 20 mEq tablet Take 1 Tablet by mouth two (2) times a day. diclofenac EC (VOLTAREN) 75 mg EC tablet Take 1 Tablet by mouth two (2) times a day. Start after steroid. metoprolol succinate (TOPROL-XL) 50 mg XL tablet TAKE 1 TABLET EVERY DAY    clotrimazole-betamethasone (LOTRISONE) topical cream APPLY TOPICALLY TO THE AFFECTED AREA TWICE DAILY    ergocalciferol (ERGOCALCIFEROL) 1,250 mcg (50,000 unit) capsule TAKE 1 CAPSULE EVERY 7 DAYS. lisinopriL (PRINIVIL, ZESTRIL) 40 mg tablet TAKE 1 TABLET EVERY DAY    amLODIPine (NORVASC) 5 mg tablet TAKE 1 TABLET EVERY DAY    simvastatin (ZOCOR) 20 mg tablet TAKE 1 TABLET EVERY NIGHT    clonazePAM (KLONOPIN) 1 mg tablet take 1 tablet by mouth twice a day    LORazepam (ATIVAN) 1 mg tablet Take 1 hour before MRI. May repeat a second dose just before entry MRI if anxiety levels are high. (Patient not taking: Reported on 12/13/2022)     No current facility-administered medications for this visit.         Past Medical History:   Diagnosis Date    Anxiety     Constipation     Difficulty swallowing pills     Headache     Hypertension     Muscle pain       Past Surgical History:   Procedure Laterality Date    HX CATARACT REMOVAL        Social History     Tobacco Use    Smoking status: Every Day     Packs/day: 1.50     Years: 50.00     Pack years: 75.00     Types: Cigarettes    Smokeless tobacco: Never   Substance Use Topics    Alcohol use: No      Family History   Problem Relation Age of Onset    Dementia Mother     Anxiety Mother     Pulmonary Embolism Father     Hypertension Father         Allergies   Allergen Reactions    Ciprofloxacin Unknown (comments)    Lidocaine Shortness of Breath    Pcn [Penicillins] Unknown (comments)    Shellfish Derived Hives    Sulfa (Sulfonamide Antibiotics) Unknown (comments)        Assessment/Plan  Diagnoses and all orders for this visit:    1. Renal mass, left-image concerning for renal cell carcinoma. I discussed with her that this is very likely cancer with likely metastasis to the bone. We discussed that if it is determined that bone metastasis are causing symptoms, radiation may be beneficial.  We also discussed the importance of seeing oncology as well as urology to discuss further diagnostic and come up with a plan and for therapy. Questions were answered to the best of my ability. We will work on obtaining an appointment for her with oncology. 2. Abdominal aortic aneurysm (AAA) without rupture, unspecified part-above 5.5 cm. We will reach out to vascular surgeon to see how urgently she needs to be seen. Blood pressure is under control. 3. DDD (degenerative disc disease), lumbar-still significant and likely multifactorial.  Continue diclofenac for time being. Add as needed tramadol.  -     traMADoL (ULTRAM) 50 mg tablet; Take 1 Tablet by mouth every six (6) hours as needed for Pain for up to 7 days. Max Daily Amount: 200 mg.    4. Hypokalemia-repeat blood work. -     METABOLIC PANEL, BASIC; Future  -     CBC WITH AUTOMATED DIFF; Future    5. Essential hypertension- stable. -     METABOLIC PANEL, BASIC; Future  -     CBC WITH AUTOMATED DIFF; Future    6. Yeast infection  -     fluconazole (DIFLUCAN) 150 mg tablet; Take 1 Tablet by mouth every three (3) days for 3 doses. FDA advises cautious prescribing of oral fluconazole in pregnancy. Jaswant Jeffrey MD  12/13/2022    This note was created with the help of speech recognition software Brandon Nuñez) and may contain some 'sound alike' errors.

## 2022-12-14 DIAGNOSIS — E87.5 HYPERKALEMIA: Primary | ICD-10-CM

## 2022-12-14 DIAGNOSIS — E87.5 HYPERKALEMIA: ICD-10-CM

## 2022-12-14 LAB
ANION GAP SERPL CALC-SCNC: 4 MMOL/L (ref 5–15)
BUN SERPL-MCNC: 27 MG/DL (ref 6–20)
BUN/CREAT SERPL: 16 (ref 12–20)
CALCIUM SERPL-MCNC: 10.6 MG/DL (ref 8.5–10.1)
CHLORIDE SERPL-SCNC: 106 MMOL/L (ref 97–108)
CO2 SERPL-SCNC: 22 MMOL/L (ref 21–32)
CREAT SERPL-MCNC: 1.64 MG/DL (ref 0.55–1.02)
GLUCOSE SERPL-MCNC: 110 MG/DL (ref 65–100)
POTASSIUM SERPL-SCNC: 6.3 MMOL/L (ref 3.5–5.1)
SODIUM SERPL-SCNC: 132 MMOL/L (ref 136–145)

## 2022-12-14 NOTE — PROGRESS NOTES
Spoke with Patient, not having any symptoms and is feeling fine. Explained if any chest pain, SOB, or flutters in heart beat to go to ER, patient will come in today and repeat lab for potassum.

## 2022-12-14 NOTE — PROGRESS NOTES
Appointment already set up with oncology and reached out to [address] [place], [place] [address]MD [place] office, spoke with Luci Yuen. and faxed over results his nurse will reach out to me or patient to help schedule an appointment.

## 2022-12-14 NOTE — PROGRESS NOTES
[facility] at [address], [address] [place] [address] Rank: [phone]  F: [phone]      Reason for Visit:   Sally Macedo is a 65 y.o. female who is seen in consultation at the request of Dr. Feliciano Bautista for evaluation of metastatic renal cell carcinoma. Hematology/Oncology Treatment History:   MRI Lumbar spine 12/2/202: Large left renal mass concerning for renal cell carcinoma. Bony metastatic disease to L1 3 and S1. 6.2 cm abdominal aortic aneurysm. Degenerative spine L1-2  CT A/P wo contrast 12/12/2022: 6.9 cm x 5 cm x 7.1 cm inhomogeneous mass arising from the posterior lower pole of the left kidney. Findings are consistent with solid renal neoplasm, specifically renal cell carcinoma. T10, L1, L3, S1 osseous metastatic disease. 5.8 cm x 6.4 cm fusiform infrarenal abdominal aortic aneurysm. History of Present Illness:   Sally Macedo is a pleasant 65 y.o. female who presents today for evaluation of metastatic renal cell carcinoma. She has had chronic back pain for many years, but she has had some left leg pain for the past 6 months or so. Her PCP obtained an MRI to further evaluate, and this was concerning for a renal mass and osseous metastatic disease. She was further evaluated with a non-contrast CT which confirmed these findings and also noted an AAA. She has been referred to vascular surgery regarding her aneurysm, sees them next week. She has been referred to urology regarding the renal mass, sees them later this month. She is seeing me today due to the concern for metastatic disease. She reports significant pain in her bilateral upper legs. This is limiting her mobility, and she is now spending most of the day in bed. Has been largely in bed since May. Having some incontinence due to difficulty getting to the bathroom in time. Previously had some blood in her urine, none recently. She has not been taking anything for her pain.   She has tramadol from her PCP but hasn't taken this. She has a shellfish allergy, diagnosed in the past 10 years. She gets hives on her face. She reports significant chronic anxiety. Taking clonazepam twice a day from psychiatry. She saw her psychiatrist this morning. Review of systems was obtained and pertinent findings reviewed above. Past medical history, social history, family history, medications, and allergies are located in the electronic medical record. Physical Exam:   Visit Vitals  BP (!) 97/54 (BP 1 Location: Left upper arm, BP Patient Position: Sitting)   Pulse 67   Temp 96.9 °F (36.1 °C) (Temporal)   Resp 16   Ht 5' 5\" (1.651 m)   Wt 128 lb (58.1 kg)   SpO2 98%   BMI 21.30 kg/m²     ECOG PS: 3  General: no distress  Eyes: anicteric sclerae  HENT: oropharynx clear  Neck: supple  Lymphatic: no cervical, supraclavicular, or inguinal adenopathy  Respiratory: normal respiratory effort  CV: no peripheral edema  GI: soft, nontender, nondistended, no masses  Skin: no rashes, no ecchymoses, no petechiae    Results:     Lab Results   Component Value Date/Time    WBC 12.5 (H) 12/13/2022 10:20 AM    HGB 12.3 12/13/2022 10:20 AM    HCT 39.8 12/13/2022 10:20 AM    PLATELET 118 94/84/1803 10:20 AM    MCV 96.4 12/13/2022 10:20 AM    ABS. NEUTROPHILS 9.8 (H) 12/13/2022 10:20 AM     Lab Results   Component Value Date/Time    Sodium 132 (L) 12/14/2022 12:13 PM    Potassium 6.3 (H) 12/14/2022 12:13 PM    Chloride 106 12/14/2022 12:13 PM    CO2 22 12/14/2022 12:13 PM    Glucose 110 (H) 12/14/2022 12:13 PM    BUN 27 (H) 12/14/2022 12:13 PM    Creatinine 1.64 (H) 12/14/2022 12:13 PM    GFR est AA >60 10/26/2020 12:41 PM    GFR est non-AA >60 10/26/2020 12:41 PM    Calcium 10.6 (H) 12/14/2022 12:13 PM     Lab Results   Component Value Date/Time    Bilirubin, total 0.6 11/15/2022 04:49 PM    ALT (SGPT) 12 11/15/2022 04:49 PM    Alk.  phosphatase 89 11/15/2022 04:49 PM    Protein, total 6.4 11/15/2022 04:49 PM Albumin 3.3 (L) 11/15/2022 04:49 PM    Globulin 3.1 11/15/2022 04:49 PM       Records from Dr. Surya Palm reviewed and summarized above. Test results above have been reviewed. Assessment:   1) Renal Cell Carcinoma  Left renal mass is concerning for a primary RCC. Imaging is additionally concerning for osseous metastatic disease. I recommend further staging studies to include a CT C/A/P with IV contrast and a bone scan. I will also consult IR for possible bone biopsy. Urology consultation is pending as well, scheduled with Dr. Andrew Gaines for 12/29. I am worried about her ability to tolerate systemic therapy, given her poor performance status. We discussed the importance of working on her nutrition and physical activity while proceeding with her evaluation. 2) Bone metastases  Obtain bone scan. Recommend calcium and vitamin D. 3) AAA  Vascular surgery consultation pending, scheduled with Dr. Herbert Members on 12/19. 4) Shellfish allergy  We will premedicate prior to her CT. 5) Anxiety  Follows with psychiatry, on clonazepam.    6) Cancer pain  PCP has prescribed her tramadol, but she is not taking it. I encouraged her to try this, as I would like to see her increasing her mobility. Refer to palliative medicine for their assistance. 7) Debility  Discussed the importance of increasing her physical activity. 8) Weight loss  10 pounds in the past 2 months. I will ask our dietician to reach out by phone. 9) Lidocaine allergy  She reports difficulty breathing and syncope with lidocaine in the past.  We will discuss with IR what options we have for a biopsy. 10) Constipation  May worsen with pain medication. Provided some educational materials about OTC measures.     Plan:     CT C/A/P with contrast  Premedicate with prednisone and benadryl prior to CT  Bone scan  IR consult for bone biopsy  Urology consultation pending  Vascular surgery consultation pending  Referral to palliative medicine  Dietician to reach out by phone   to meet with patient today, provide resources and counseling  Follow up with me to review results of above    Signed By: Kerwin Galdamez MD

## 2022-12-15 NOTE — PROGRESS NOTES
Spoke with patient, verbalized understanding of labs, Patient will stay away of potassium high foods and come by in 2 weeks for another lab. Patient also aware of Dr. Ren Vargas office being in touch for an appointment.

## 2022-12-16 ENCOUNTER — OFFICE VISIT (OUTPATIENT)
Dept: ONCOLOGY | Age: 77
End: 2022-12-16
Payer: MEDICARE

## 2022-12-16 VITALS
RESPIRATION RATE: 16 BRPM | TEMPERATURE: 96.9 F | OXYGEN SATURATION: 98 % | HEART RATE: 67 BPM | BODY MASS INDEX: 21.33 KG/M2 | SYSTOLIC BLOOD PRESSURE: 97 MMHG | HEIGHT: 65 IN | WEIGHT: 128 LBS | DIASTOLIC BLOOD PRESSURE: 54 MMHG

## 2022-12-16 DIAGNOSIS — N28.89 RENAL MASS: Primary | ICD-10-CM

## 2022-12-16 DIAGNOSIS — C79.51 METASTASIS TO BONE (HCC): ICD-10-CM

## 2022-12-16 DIAGNOSIS — Z91.013 SHELLFISH ALLERGY: ICD-10-CM

## 2022-12-16 RX ORDER — PREDNISONE 10 MG/1
TABLET ORAL
Qty: 15 TABLET | Refills: 0 | Status: SHIPPED | OUTPATIENT
Start: 2022-12-16

## 2022-12-19 ENCOUNTER — PATIENT OUTREACH (OUTPATIENT)
Dept: CASE MANAGEMENT | Age: 77
End: 2022-12-19

## 2022-12-19 ENCOUNTER — TELEPHONE (OUTPATIENT)
Dept: ONCOLOGY | Age: 77
End: 2022-12-19

## 2022-12-19 NOTE — PROGRESS NOTES
Ambulatory Care Management Note    Date/Time:  12/19/2022 4:07 PM    This patient was received as a referral from  provider schedule . Ambulatory Care Manager outreached to patient today to offer care management services. Introduction to self and role of care manager provided. Patient accepted care management services at this time. Follow up call scheduled at this time. Patient has Ambulatory Care Manager's contact number for any questions or concerns. Pt accepted CCM services, however pt requesting to call ACM back at later time due to pt being on the other line.      Alejo Jacobs RN, BSN - Ambulatory Care Manager  [phone]

## 2022-12-19 NOTE — TELEPHONE ENCOUNTER
Lm with patient to get him scheduled for a follow up appointment to review pathology results. Per Dr. Chapito Peter please schedule on Friday 1/6/23 at 8:45am. Gave him office number to call back.

## 2022-12-20 ENCOUNTER — TRANSCRIBE ORDER (OUTPATIENT)
Dept: SCHEDULING | Age: 77
End: 2022-12-20

## 2022-12-20 ENCOUNTER — TELEPHONE (OUTPATIENT)
Dept: ONCOLOGY | Age: 77
End: 2022-12-20

## 2022-12-20 DIAGNOSIS — I71.40 ABDOMINAL AORTIC ANEURYSM: Primary | ICD-10-CM

## 2022-12-20 NOTE — TELEPHONE ENCOUNTER
[facility] at [address], [address] [place] [id number]  W: [phone]  F: [phone]    Medical Nutrition Therapy  Nutrition Referral:    Referral received from Dr. Macho Blair regarding weight loss. Patient with new renal cell carcinoma. Concern with treatment tolerance given performance status, plan for physical therapy and improvement on nutrition. Called patient, no answer. Left a message, explaining that RD is available to address nutrition throughout the spectrum of care. Contact information provided.       Wt Readings from Last 5 Encounters:   12/16/22 128 lb (58.1 kg)   12/13/22 128 lb (58.1 kg)   11/15/22 133 lb 6.4 oz (60.5 kg)   05/12/22 144 lb 9.6 oz (65.6 kg)   10/26/20 148 lb 6.4 oz (67.3 kg)         Signed By: Anai Hendrix, [address]

## 2022-12-21 ENCOUNTER — TELEPHONE (OUTPATIENT)
Dept: PALLATIVE CARE | Age: 77
End: 2022-12-21

## 2022-12-23 ENCOUNTER — TELEPHONE (OUTPATIENT)
Dept: INTERNAL MEDICINE CLINIC | Age: 77
End: 2022-12-23

## 2022-12-23 DIAGNOSIS — M51.36 DDD (DEGENERATIVE DISC DISEASE), LUMBAR: ICD-10-CM

## 2022-12-23 RX ORDER — TRAMADOL HYDROCHLORIDE 50 MG/1
50 TABLET ORAL
Qty: 30 TABLET | Refills: 2 | Status: SHIPPED | OUTPATIENT
Start: 2022-12-23 | End: 2022-12-30

## 2022-12-23 NOTE — TELEPHONE ENCOUNTER
Patient called to state the pharmacy never received her prescription for tramadol that the nurse/doctor was going to send in yesterday.

## 2022-12-27 ENCOUNTER — TELEPHONE (OUTPATIENT)
Dept: ONCOLOGY | Age: 77
End: 2022-12-27

## 2022-12-27 NOTE — TELEPHONE ENCOUNTER
Patient called and stated she cancelled her biopsy for tomorrow and she would like to speak with Dr. Jules Brown. She stated she would also like to cancel her appointment for this Friday. I advised her I was going to leave the appointment until she spoke with the team. Please advise and call patient back.     # [phone]

## 2022-12-27 NOTE — TELEPHONE ENCOUNTER
Jackie Perla from the authorization department  called and stated this patients procedure has been denied. She stated there is no peer to peer option available but there is an appeal option available. Please advise.     Appeal # [id number]  Tracking # [id number]    Cb# for Jackie Perla [phone]

## 2022-12-28 ENCOUNTER — TELEPHONE (OUTPATIENT)
Dept: PALLATIVE CARE | Age: 77
End: 2022-12-28

## 2022-12-28 NOTE — TELEPHONE ENCOUNTER
Spoke to patient. States, \"I forgot what I wanted to talk to you all about\". States they have \"some things that have come up in the family\" and she is not sure she wants to proceed with the biopsy. Kyphoplasty was  not approved by insurance. This may need to be changed to bone biopsy only, but will hold off on re-ordering as patient is unsure she wants to proceed with biopsy at all. Patient reports constipation. She is taking Doculax and Gas-X for symptom relief. Reviewed dosing instructions for these. Will keep office visit for now for next week on Friday to discuss plan of care if patient does not want to proceed with biopsy/treatment.

## 2022-12-28 NOTE — TELEPHONE ENCOUNTER
[facility] Palliative Medicine Office  Nursing Note  [phone]  Fax [phone]      Name:  Francisco Michaels  YOB: 1945    Received outpatient Palliative Medicine referral from Dr. Debbie Curling Raddin to see patient for symptom management and supportive care. Chart  reviewed. Francisco Michaels is a 65 y.o. female with renal call carcinoma with bone mets. Patient's most recent office visit with Dr. Samuel Cook was on 12/16/22. See his note for complete HPI, treatment history, and plan. ACP:     No ACP documents on file    Nurse called patient to [facility] outpatient Palliative Medicine services and to schedule appointment. No answer, nurse left message requesting call back.      RICK DeeN, RN-BC, [facility]

## 2022-12-28 NOTE — TELEPHONE ENCOUNTER
[facility] Outpatient Palliative Medicine Office  Nursing Note  [phone]  Fax [phone]     Name:  Elvia Oneill  YOB: 1945     Call # 2 to patient to follow up on outpatient Palliative Medicine referral.  This nurse spoke with patient and explained outpatient Palliative Medicine services. Appt scheduled for 1/9/23 at 2:30pm with Dr. Brian Betts. This nurse advised patient that we book one hour appointments for each patient and to please call our office at [phone] if she needs to change or cancel the appointment. She voices understanding.     Jenniffer Vasquez, RICKN, RN  Palliative Medicine  [phone]

## 2023-01-03 ENCOUNTER — TELEPHONE (OUTPATIENT)
Dept: ONCOLOGY | Age: 78
End: 2023-01-03

## 2023-01-06 ENCOUNTER — OFFICE VISIT (OUTPATIENT)
Dept: ONCOLOGY | Age: 78
End: 2023-01-06
Payer: MEDICARE

## 2023-01-06 ENCOUNTER — TELEPHONE (OUTPATIENT)
Dept: PALLATIVE CARE | Age: 78
End: 2023-01-06

## 2023-01-06 ENCOUNTER — TELEPHONE (OUTPATIENT)
Dept: ONCOLOGY | Age: 78
End: 2023-01-06

## 2023-01-06 VITALS
HEART RATE: 70 BPM | OXYGEN SATURATION: 97 % | DIASTOLIC BLOOD PRESSURE: 50 MMHG | SYSTOLIC BLOOD PRESSURE: 86 MMHG | TEMPERATURE: 97 F | RESPIRATION RATE: 20 BRPM

## 2023-01-06 DIAGNOSIS — N28.89 RENAL MASS: Primary | ICD-10-CM

## 2023-01-06 DIAGNOSIS — C79.51 METASTASIS TO BONE (HCC): ICD-10-CM

## 2023-01-06 NOTE — TELEPHONE ENCOUNTER
[facility] Lula Chaudhary at [place]  [phone]    01/06/23- New referral faxed to [facility] to set up info session. Fax confirmation received. 01/11/23- Call placed to [facility], spoke to Tee who stated information session was done yesterday afternoon. The family is thinking about options and will notify [facility] of their decision later this week. 01/20/23- Spoke to Tee with [facility], patient and family are requesting admission on 1/28.

## 2023-01-06 NOTE — PROGRESS NOTES
[facility] [place] [address] [facility] [address] 100[address] [facility] [phone]  F: [phone]      Reason for Visit:   Laci Renae is a 65 y.o. female who is seen in follow up for evaluation of metastatic renal cell carcinoma. Hematology/Oncology Treatment History:   MRI Lumbar spine : Large left renal mass concerning for renal cell carcinoma. Bony metastatic disease to L1 3 and S1. 6.2 cm abdominal aortic aneurysm. Degenerative spine L1-2  CT A/P wo contrast 2022: 6.9 cm x 5 cm x 7.1 cm inhomogeneous mass arising from the posterior lower pole of the left kidney. Findings are consistent with solid renal neoplasm, specifically renal cell carcinoma. T10, L1, L3, S1 osseous metastatic disease. 5.8 cm x 6.4 cm fusiform infrarenal abdominal aortic aneurysm. History of Present Illness:   She saw Dr. Deja Jade (vascular surgery), and he ordered a CT angiogram.  This was scheduled, but she cancelled it. She was scheduled for bone biopsy and kyphoplasty, but the kyphoplasty was denied. When we attempted to reschedule the bone biopsy alone, she declined to schedule. She has not yet had her CT or bone scan either, she is not sure she wants to have this done. She overall continues to feel poorly. Significant constipation. Pain. Fatigue. Remains very weak, in wheelchair. Spends much of her time in bed. She is accompanied by her  today. Review of systems was obtained and pertinent findings reviewed above. Past medical history, social history, family history, medications, and allergies are located in the electronic medical record. Physical Exam:   Visit Vitals  BP (!) 86/50 (BP 1 Location: Left upper arm, BP Patient Position: Sitting, BP Cuff Size: Large adult) Comment: .    Pulse 70   Temp 97 °F (36.1 °C) (Temporal)   Resp 20   SpO2 97%     ECOG PS: 3  General: no distress, weak and frail appearing, in wheelchair  Respiratory: normal respiratory effort  CV: no peripheral edema  Skin: no rashes; no ecchymoses; no petechiae      Results:     Lab Results   Component Value Date/Time    WBC 12.5 (H) 12/13/2022 10:20 AM    HGB 12.3 12/13/2022 10:20 AM    HCT 39.8 12/13/2022 10:20 AM    PLATELET 311 23/95/6873 10:20 AM    MCV 96.4 12/13/2022 10:20 AM    ABS. NEUTROPHILS 9.8 (H) 12/13/2022 10:20 AM     Lab Results   Component Value Date/Time    Sodium 132 (L) 12/14/2022 12:13 PM    Potassium 6.3 (H) 12/14/2022 12:13 PM    Chloride 106 12/14/2022 12:13 PM    CO2 22 12/14/2022 12:13 PM    Glucose 110 (H) 12/14/2022 12:13 PM    BUN 27 (H) 12/14/2022 12:13 PM    Creatinine 1.64 (H) 12/14/2022 12:13 PM    GFR est AA >60 10/26/2020 12:41 PM    GFR est non-AA >60 10/26/2020 12:41 PM    Calcium 10.6 (H) 12/14/2022 12:13 PM     Lab Results   Component Value Date/Time    Bilirubin, total 0.6 11/15/2022 04:49 PM    ALT (SGPT) 12 11/15/2022 04:49 PM    Alk. phosphatase 89 11/15/2022 04:49 PM    Protein, total 6.4 11/15/2022 04:49 PM    Albumin 3.3 (L) 11/15/2022 04:49 PM    Globulin 3.1 11/15/2022 04:49 PM           Assessment:   1) Renal Cell Carcinoma  Left renal mass is concerning for a primary RCC. Imaging is additionally concerning for osseous metastatic disease. I have recommended a biopsy as well as further staging studies to include a CT C/A/P with IV contrast and a bone scan. However, she has not scheduled these. She is not sure that she wants to proceed with more testing or with any treatment. We had a lengthy discussion today about goals of care. We reviewed her likely diagnosis and prognosis, but I explained that a biopsy would be needed to have more certainty about her prognosis and management options. We discussed the alternative option of supportive care and symptom management with the help of hospice. At the conclusion of our visit, she is very much leaning towards home hospice, though she was not ready to make a final decision.     I will set her up for a hospice information session. In the meantime, she will give some thought to how she wants to proceed. If she decides to move forward with workup, then I encouraged her to keep her palliative medicine appointment next week, and let us know so we can get her biopsy rescheduled. 2) Bone metastases  Obtain bone scan. Recommend calcium and vitamin D. 3) AAA  Seeing Dr. Mahi Sweeney, who ordered a CTA. Patient cancelled this. Consider rescheduling if she does not enroll with hospice care. 4) Shellfish allergy  We will premedicate prior to her CT. 5) Anxiety  Follows with psychiatry, on clonazepam.    6) Cancer pain  Continue tramadol PRN. Palliative medicine appt scheduled for next week.     7) Debility    8) Weight loss  Dietician has reached out by phone    9) Lidocaine allergy  She reports difficulty breathing and syncope with lidocaine in the past.    10) Constipation  Reviewed OTC measures again today    Plan:     Hospice information session  Patient will consider her options and let us know how she wants to proceed      Signed By: Kalin Montero MD

## 2023-01-06 NOTE — TELEPHONE ENCOUNTER
Called patient to advise/confirm upcoming appt with Dr. Humera Nolan on 1/9/23 at 2:30 at [facility]. The patient's spouse spoke with Dr. Bear Beal and they are considering Hospice. Appointment has been cancelled. If anything changes they will call back.

## 2023-01-12 ENCOUNTER — DOCUMENTATION ONLY (OUTPATIENT)
Dept: INTERNAL MEDICINE CLINIC | Age: 78
End: 2023-01-12

## 2023-01-12 DIAGNOSIS — M51.36 DDD (DEGENERATIVE DISC DISEASE), LUMBAR: ICD-10-CM

## 2023-01-12 RX ORDER — NALOXONE HYDROCHLORIDE 4 MG/.1ML
SPRAY NASAL
Qty: 1 EACH | Refills: 1 | Status: SHIPPED | OUTPATIENT
Start: 2023-01-12

## 2023-01-12 RX ORDER — TRAMADOL HYDROCHLORIDE 50 MG/1
50 TABLET ORAL
Qty: 42 TABLET | Refills: 0 | Status: CANCELLED | OUTPATIENT
Start: 2023-01-12 | End: 2023-02-11

## 2023-01-12 NOTE — TELEPHONE ENCOUNTER
----- Message from Isaura Annika sent at 1/11/2023 11:21 AM EST -----  Subject: Message to Provider    QUESTIONS  Information for Provider? Patient's  requesting a call back as they   have some questions about patients medication.   ---------------------------------------------------------------------------  --------------  Victoria Hargrove ZZXE  [phone]; OK to leave message on voicemail  ---------------------------------------------------------------------------  --------------  SCRIPT ANSWERS  Relationship to Patient? Other  Representative Name? Hugo Lamas  Is the Representative on the appropriate HIPAA document in Epic?  Yes

## 2023-01-13 ENCOUNTER — TELEPHONE (OUTPATIENT)
Dept: INTERNAL MEDICINE CLINIC | Age: 78
End: 2023-01-13

## 2023-01-13 DIAGNOSIS — M51.36 DDD (DEGENERATIVE DISC DISEASE), LUMBAR: ICD-10-CM

## 2023-01-13 DIAGNOSIS — R29.898 WEAKNESS OF BOTH LEGS: ICD-10-CM

## 2023-01-13 DIAGNOSIS — R32 URINARY INCONTINENCE, UNSPECIFIED TYPE: ICD-10-CM

## 2023-01-13 DIAGNOSIS — M62.81 MUSCLE WEAKNESS: ICD-10-CM

## 2023-01-13 NOTE — TELEPHONE ENCOUNTER
Patient's  called via the [facility] to discuss his wife's medication. They have several questions regarding her medication. Please call back and advise at [phone].

## 2023-01-14 RX ORDER — DICLOFENAC SODIUM 75 MG/1
75 TABLET, DELAYED RELEASE ORAL 2 TIMES DAILY
Qty: 60 TABLET | Refills: 0 | Status: SHIPPED | OUTPATIENT
Start: 2023-01-14

## 2023-01-16 ENCOUNTER — TELEPHONE (OUTPATIENT)
Dept: ONCOLOGY | Age: 78
End: 2023-01-16

## 2023-01-16 DIAGNOSIS — E55.9 VITAMIN D DEFICIENCY: ICD-10-CM

## 2023-01-16 DIAGNOSIS — M51.36 DDD (DEGENERATIVE DISC DISEASE), LUMBAR: ICD-10-CM

## 2023-01-16 RX ORDER — ERGOCALCIFEROL 1.25 MG/1
CAPSULE ORAL
Qty: 12 CAPSULE | Refills: 0 | Status: SHIPPED | OUTPATIENT
Start: 2023-01-16

## 2023-01-16 RX ORDER — TRAMADOL HYDROCHLORIDE 50 MG/1
50 TABLET ORAL
Qty: 60 TABLET | Refills: 0 | Status: SHIPPED | OUTPATIENT
Start: 2023-01-16 | End: 2023-01-31

## 2023-01-16 NOTE — TELEPHONE ENCOUNTER
[address] at [facility]  [phone]    I called the patient to check in. She reports she is doing about the same, but having some difficulty with her pain medication. She was unable to explain what she means by this, so she had her  speak with me. He says they are out of tramadol. I sent a new prescription in for her. She cancelled her palliative medicine appointment. She also tells me that she cancelled her hospice information session, she did not feel that she was ready for this. However, her  says he did a hospice information session over the phone. They are thinking about enrolling with hospice this week or next. I encouraged him no to delay, as she would benefit from there additional assistance.

## 2023-01-18 ENCOUNTER — PATIENT OUTREACH (OUTPATIENT)
Dept: CASE MANAGEMENT | Age: 78
End: 2023-01-18

## 2023-01-18 NOTE — LETTER
1/19/2023 1:18 PM    Ms. Mundo Cleary [id number]          My name is Shoaib Humphrey, and I am a Care Manager with [facility]. I often work with patients who could benefit from additional support understanding and managing their health. We are committed to providing you excellent care. I have been unable to reach you on this via phone. Please contact me at [phone] if you would like additional help with community resources. We appreciate the confidence you've shown by selecting us to provide your healthcare needs, and I look forward to hearing from you soon.            Sincerely,      Carter Jordan

## 2023-01-18 NOTE — PROGRESS NOTES
1/18/2023  2:46 PM    Patient outreach attempt by this ACM today to perform CCM assessment. Attempts to reach patient were unsuccessful. Left voicemail requesting call back and ACM contact information.      Parveen Posey RN, BSN - Ambulatory Care Manager  [phone]

## 2023-01-19 NOTE — PROGRESS NOTES
1/19/2023  1:19 PM    Patient outreach attempt by this ACM today to perform CCM assessment. Attempts to reach patient were unsuccessful. Left voicemail requesting call back and ACM contact information. GIT letter mailed to pt's address on file.     Florinda Hallman RN, BSN - Ambulatory Care Manager  [phone]

## 2023-01-27 ENCOUNTER — VIRTUAL VISIT (OUTPATIENT)
Dept: INTERNAL MEDICINE CLINIC | Age: 78
End: 2023-01-27
Payer: MEDICARE

## 2023-01-27 DIAGNOSIS — I10 ESSENTIAL HYPERTENSION: ICD-10-CM

## 2023-01-27 DIAGNOSIS — N28.89 RENAL MASS, LEFT: Primary | ICD-10-CM

## 2023-01-27 DIAGNOSIS — I71.40 ABDOMINAL AORTIC ANEURYSM (AAA) WITHOUT RUPTURE, UNSPECIFIED PART: ICD-10-CM

## 2023-01-27 DIAGNOSIS — K59.03 DRUG-INDUCED CONSTIPATION: ICD-10-CM

## 2023-01-27 RX ORDER — LACTULOSE 10 G/15ML
10 SOLUTION ORAL; RECTAL
Qty: 480 ML | Refills: 1 | Status: SHIPPED | OUTPATIENT
Start: 2023-01-27

## 2023-01-27 NOTE — PROGRESS NOTES
Tyron Green was seen on 1/27/2023 using synchronous (real-time) audio-video technology; telephone. Consent: Tyron Green, who was seen by synchronous (real-time) audio-video technology, and/or her healthcare decision maker, is aware that this patient-initiated, Telehealth encounter on 1/27/2023 is a billable service, with coverage as determined by her insurance carrier. She is aware that she may receive a bill and has provided verbal consent to proceed: Yes. I was in the office while conducting this encounter. Tyron Green is a 65 y.o. female who presents today for Follow-up (VV// pt presenting today for routine follow-up, pt is not feeling well; has medication questions )  . She has a history of   Patient Active Problem List   Diagnosis Code    Essential hypertension I10    Mixed hyperlipidemia E78.2    Vitamin D deficiency E55.9    Pre-diabetes R73.03    Tobacco abuse Z72.0    Family history of abdominal aortic aneurysm (AAA) Z82.49    Anxiety F41.9    Advanced care planning/counseling discussion Z71.89    Metastasis to bone ([facility] [address] C79.51   . Today patient is being seen for follow up. RCC: has met with Heme/Onc. Plan was for biopsy and kyphoplasty, but she has declined this therapy. They are meeting with Hospice with [facility] next week. We discussed the goals of hospice. Also discussed that it is her decision. Tramadol does seem to be helping with the pain but does not last long enough. We discussed that she can increase it to 100 mg per dose or dose it a bit more frequently. She is having some pretty bad constipation. We discussed trying lactulose as over-the-counter laxatives do not seem to be helping. We discussed that this is a side effect of that medication. AAA: Met with vascular surgeon, has opted to not do the scan. They are planning on talking with hospice. Hypertension-blood pressure is low at home. She is feeling poorly.   She is currently only on half dose of lisinopril. Still on her beta-blocker. We will have her stop the lisinopril altogether  Hypertension ROS: taking medications as instructed, no TIA's, no chest pain on exertion, no dyspnea on exertion, no swelling of ankles     reports that she has been smoking cigarettes. She has a 75.00 pack-year smoking history. She has never used smokeless tobacco.    reports no history of alcohol use. BP Readings from Last 2 Encounters:   01/06/23 (!) 86/50   12/16/22 (!) 97/54       ROS  Review of Systems   Constitutional:  Positive for malaise/fatigue. Negative for chills, fever and weight loss. HENT:  Negative for congestion and sore throat. Eyes:  Negative for blurred vision, double vision and photophobia. Respiratory:  Negative for cough and shortness of breath. Cardiovascular:  Negative for chest pain, palpitations and leg swelling. Gastrointestinal:  Negative for abdominal pain, constipation, diarrhea, heartburn, nausea and vomiting. Genitourinary:  Negative for dysuria, frequency and urgency. Musculoskeletal:  Positive for back pain, joint pain and myalgias. Skin:  Negative for rash. Neurological: Negative. Negative for headaches. Endo/Heme/Allergies:  Does not bruise/bleed easily. Psychiatric/Behavioral:  Negative for memory loss and suicidal ideas. There were no vitals taken for this visit. Patient-Reported Vitals 1/27/2023   Patient-Reported Pulse 65   Patient-Reported Systolic  95   Patient-Reported Diastolic 80        Physical Exam  Neurological:      Mental Status: She is alert. Current Outpatient Medications   Medication Sig    ergocalciferol (ERGOCALCIFEROL) 1,250 mcg (50,000 unit) capsule TAKE 1 CAPSULE EVERY 7 DAYS.    traMADoL (ULTRAM) 50 mg tablet Take 1 Tablet by mouth every six (6) hours as needed for Pain for up to 15 days. Max Daily Amount: 200 mg.    diclofenac EC (VOLTAREN) 75 mg EC tablet Take 1 Tablet by mouth two (2) times a day.  Start after steroid. naloxone (NARCAN) 4 mg/actuation nasal spray Use 1 spray intranasally, then discard. Repeat with new spray every 2 min as needed for opioid overdose symptoms, alternating nostrils. predniSONE (DELTASONE) 10 mg tablet Take 50mg (5 tabs) about 13 hours before, 7 hours before, and 1 hour before CT scan.    metoprolol succinate (TOPROL-XL) 50 mg XL tablet TAKE 1 TABLET EVERY DAY    clotrimazole-betamethasone (LOTRISONE) topical cream APPLY TOPICALLY TO THE AFFECTED AREA TWICE DAILY    lisinopriL (PRINIVIL, ZESTRIL) 40 mg tablet TAKE 1 TABLET EVERY DAY    simvastatin (ZOCOR) 20 mg tablet TAKE 1 TABLET EVERY NIGHT    clonazePAM (KLONOPIN) 1 mg tablet take 1 tablet by mouth twice a day    amLODIPine (NORVASC) 5 mg tablet TAKE 1 TABLET EVERY DAY (Patient not taking: No sig reported)     No current facility-administered medications for this visit. Past Medical History:   Diagnosis Date    Anxiety     Constipation     Difficulty swallowing pills     Headache     Hypertension     Muscle pain       Past Surgical History:   Procedure Laterality Date    HX CATARACT REMOVAL        Social History     Tobacco Use    Smoking status: Every Day     Packs/day: 1.50     Years: 50.00     Pack years: 75.00     Types: Cigarettes    Smokeless tobacco: Never   Substance Use Topics    Alcohol use: No      Family History   Problem Relation Age of Onset    Dementia Mother     Anxiety Mother     Pulmonary Embolism Father     Hypertension Father         Allergies   Allergen Reactions    Ciprofloxacin Unknown (comments)    Lidocaine Shortness of Breath    Pcn [Penicillins] Unknown (comments)    Shellfish Derived Hives    Sulfa (Sulfonamide Antibiotics) Unknown (comments)        Assessment/Plan  Diagnoses and all orders for this visit:    1. Renal mass, left-I discussed with her at length that hospice and palliative care would be reasonable approach.   Currently she has declined any further invasive procedures to look at her renal mass or her aneurysm. She will be meeting with hospice next week. We discussed the goals of hospice and a focus on quality of life. She will let us know when she meets with them. 2. Abdominal aortic aneurysm (AAA) without rupture, unspecified part    3. Drug-induced constipation- Try lactlose  -     lactulose (CHRONULAC) 10 gram/15 mL solution; Take 15 mL by mouth three (3) times daily as needed (constipation). 4. Essential hypertension- blood pressure is low at home. She is feeling poorly. She is currently only on half dose of lisinopril. Still on her beta-blocker. We will have her stop the lisinopril altogether        5 minutes were spent over the phone with patient and her . Raeann Coombs, was evaluated through a synchronous (real-time) audio-video encounter. The patient (or guardian if applicable) is aware that this is a billable service, which includes applicable co-pays. This Virtual Visit was conducted with patient's (and/or legal guardian's) consent. The visit was conducted pursuant to the emergency declaration under the [address] [place] [address], [address] [place] [address] authority and the [employer] and [employer] General Act. Patient identification was verified, and a caregiver was present when appropriate. The patient was located in a state where the provider was licensed to provide care. Cass Acuna MD  1/27/2023    This note was created with the help of speech recognition software Cyrus Levi) and may contain some 'sound alike' errors.

## 2023-01-27 NOTE — PROGRESS NOTES
Jessyjacob Imtiaz  Identified pt with two pt identifiers(name and ). Chief Complaint   Patient presents with    Follow-up     VV// pt presenting today for routine follow-up, pt is not feeling well; has medication questions        1. Have you been to the ER, urgent care clinic since your last visit? Hospitalized since your last visit? NO    2. Have you seen or consulted any other health care providers outside of the [address] [place] [address] since your last visit? Include any pap smears or colon screening. NO      Provider notified of reason for visit, vitals and flowsheets obtained on patients. Patient received paperwork for advance directive during previous visit but has not completed at this time     Reviewed record In preparation for visit, huddled with provider and have obtained necessary documentation      Health Maintenance Due   Topic    COVID-19 Vaccine (1)    Pneumococcal 65+ years (1 - PCV)    Shingles Vaccine (1 of 2)    Bone Densitometry (Dexa) Screening     Medicare Yearly Exam     Flu Vaccine (1)       Wt Readings from Last 3 Encounters:   22 128 lb (58.1 kg)   22 128 lb (58.1 kg)   11/15/22 133 lb 6.4 oz (60.5 kg)     Temp Readings from Last 3 Encounters:   23 97 °F (36.1 °C) (Temporal)   22 96.9 °F (36.1 °C) (Temporal)   11/15/22 97.8 °F (36.6 °C) (Oral)     BP Readings from Last 3 Encounters:   23 (!) 86/50   22 (!) 97/54   22 103/66     Pulse Readings from Last 3 Encounters:   23 70   22 67   22 67     There were no vitals filed for this visit.       Learning Assessment:  :     Learning Assessment 2017   PRIMARY LEARNER Patient   PRIMARY LANGUAGE ENGLISH   LEARNER PREFERENCE PRIMARY LISTENING     READING     DEMONSTRATION   ANSWERED BY Patient   RELATIONSHIP SELF       Depression Screening:  :     3 most recent PHQ Screens 11/15/2022   Little interest or pleasure in doing things Not at all   Feeling down, depressed, irritable, or hopeless Not at all   Total Score PHQ 2 0   Trouble falling or staying asleep, or sleeping too much -   Feeling tired or having little energy -   Poor appetite, weight loss, or overeating -   Feeling bad about yourself - or that you are a failure or have let yourself or your family down -   Trouble concentrating on things such as school, work, reading, or watching TV -   Moving or speaking so slowly that other people could have noticed; or the opposite being so fidgety that others notice -   Thoughts of being better off dead, or hurting yourself in some way -   PHQ 9 Score -   How difficult have these problems made it for you to do your work, take care of your home and get along with others -       Fall Risk Assessment:  :     Fall Risk Assessment, last 12 mths 5/12/2022   Able to walk? Yes   Fall in past 12 months? 0   Do you feel unsteady? 0   Are you worried about falling 0   Number of falls in past 12 months -   Fall with injury? -       Abuse Screening:  :     Abuse Screening Questionnaire 2/21/2019 10/10/2018 6/2/2017   Do you ever feel afraid of your partner? N N N   Are you in a relationship with someone who physically or mentally threatens you? N N N   Is it safe for you to go home?  Y Y Y       ADL Screening:  :     ADL Assessment 10/10/2018   Feeding yourself No Help Needed   Getting from bed to chair No Help Needed   Getting dressed No Help Needed   Bathing or showering No Help Needed   Walk across the room (includes cane/walker) No Help Needed   Using the telphone No Help Needed   Taking your medications No Help Needed   Preparing meals No Help Needed   Managing money (expenses/bills) No Help Needed   Moderately strenuous housework (laundry) No Help Needed   Shopping for personal items (toiletries/medicines) No Help Needed   Shopping for groceries No Help Needed   Driving No Help Needed   Climbing a flight of stairs No Help Needed   Getting to places beyond walking distances No Help Needed Medication reconciliation up to date and corrected with patient at this time.

## 2023-02-01 ENCOUNTER — TELEPHONE (OUTPATIENT)
Dept: INTERNAL MEDICINE CLINIC | Age: 78
End: 2023-02-01

## 2023-02-01 NOTE — TELEPHONE ENCOUNTER
----- Message from Neeta Pedrode sent at 2/1/2023 11:23 AM EST -----  Subject: Message to Provider    QUESTIONS  Information for Provider? Pt's  called this morning and requests a   callback from \"Jessica\" for his wife. He says she has been having a hard   time eating due to cancer treatment and would appreciate urgent clinical   advice. Please contact to advise.  ---------------------------------------------------------------------------  --------------  Daryle Haver JMRL  [phone]; OK to leave message on voicemail  ---------------------------------------------------------------------------  --------------  SCRIPT ANSWERS  Relationship to Patient? Other  Representative Name? Bharath Montalvo  Is the Representative on the appropriate HIPAA document in Epic?  Yes

## 2023-02-01 NOTE — TELEPHONE ENCOUNTER
02/01/2023--This user called and spoke with the patient to get more information, she stated that ever since Sunday/Monday she has not been able to eat a lot due to her mouth being very dry and everything just looks unappealing to her. Patient wanted to know if there was anything she could do or take. I asked if she has tried any mouth lozenges and she has not. I also asked if she had contacted 's office about this issue since chemo can cause this (Message said that patient was getting cancer treatment). Patient stated that she was not getting treatment and had not called their office. I let her know that I would check with the doctor and give her a call back. Patient verbalized understanding and had no further question's or concerns at that time. all other ROS negative except as per HPI

## 2023-02-01 NOTE — TELEPHONE ENCOUNTER
02/01/2023--This user called and spoke with the patient and  and gave our recommendation's and she verbalized understanding and stated that she was not having any trouble swallowing. I let her know to let us know if things do not improve to let us know.

## 2023-02-06 ENCOUNTER — TELEPHONE (OUTPATIENT)
Dept: ONCOLOGY | Age: 78
End: 2023-02-06

## 2023-02-06 NOTE — TELEPHONE ENCOUNTER
[address] at [facility]  [phone]    02/06/23- Call placed to [facility], spoke to Tee who confirmed patient enrolled in their services on 2/3.

## 2023-02-08 ENCOUNTER — PATIENT OUTREACH (OUTPATIENT)
Dept: CASE MANAGEMENT | Age: 78
End: 2023-02-08

## 2023-02-08 NOTE — PROGRESS NOTES
Ambulatory Care Management Note      Patient has graduated from the Complex Case Management program on 2/8/2023. Multiple unsuccessful attempts have been made to contact patient. Ambulatory Care Management get in touch letter mailed to the patients address on file. No further Ambulatory Care Manager follow up scheduled. Chart reviewed by ACM - noted that pt enrolled in hospice on 2/3/23. No further outreach planned at this time. Patient has Ambulatory Care Manager's contact information for any further questions, concerns, or needs.       Laci Espinoza RN, BSN - Ambulatory Care Manager  [phone]

## 2023-02-20 ENCOUNTER — TELEPHONE (OUTPATIENT)
Dept: INTERNAL MEDICINE CLINIC | Age: 78
End: 2023-02-20

## 2023-02-20 NOTE — TELEPHONE ENCOUNTER
----- Message from Bhavani Pimentel sent at 2/20/2023  3:43 PM EST -----  Subject: Message to Provider    QUESTIONS  Information for Provider? Patient's  called to let Dr. Nelsy Lee and Yoly Carreon   know that Mayte Niteo has passed away and to say thank you for caring for her.  ---------------------------------------------------------------------------  --------------  [address]  [phone]; Do not leave any message, patient will call back for answer  ---------------------------------------------------------------------------  --------------  SCRIPT ANSWERS  Relationship to Patient? Spouse/Partner  Representative Name? , Sheron Ferrell  Is the Representative on the appropriate HIPAA document in Epic?  Yes

## 2023-02-20 NOTE — TELEPHONE ENCOUNTER
Spoke to patient's . Patient passed away last night in her sleep. Offered my condolences to him and his family. He does have good support and has received resources from hospice. He will let us know if we can be of any further assistance.

## 2023-02-21 ENCOUNTER — TELEPHONE (OUTPATIENT)
Dept: ONCOLOGY | Age: 78
End: 2023-02-21

## 2023-02-21 NOTE — TELEPHONE ENCOUNTER
[address] [facility]  at [place]  [phone]    02/21/23- Spoke to Evens Rascon with [facility], she stated patient passed on Saturday 2/18.

## 2024-01-30 NOTE — ADDENDUM NOTE
Addended by: Bre Wu on: 11/15/2022 04:26 PM     Modules accepted: Orders
Addended by: Rita Todd on: 11/22/2022 11:45 AM     Modules accepted: Orders
None